# Patient Record
Sex: FEMALE | Race: WHITE | NOT HISPANIC OR LATINO | Employment: UNEMPLOYED | URBAN - METROPOLITAN AREA
[De-identification: names, ages, dates, MRNs, and addresses within clinical notes are randomized per-mention and may not be internally consistent; named-entity substitution may affect disease eponyms.]

---

## 2017-01-19 ENCOUNTER — TELEPHONE (OUTPATIENT)
Dept: RHEUMATOLOGY | Facility: CLINIC | Age: 32
End: 2017-01-19

## 2017-01-19 ENCOUNTER — LAB VISIT (OUTPATIENT)
Dept: LAB | Facility: HOSPITAL | Age: 32
End: 2017-01-19
Attending: INTERNAL MEDICINE
Payer: COMMERCIAL

## 2017-01-19 DIAGNOSIS — I77.6 AORTITIS: ICD-10-CM

## 2017-01-19 LAB
CRP SERPL-MCNC: 24.9 MG/L
ERYTHROCYTE [SEDIMENTATION RATE] IN BLOOD BY WESTERGREN METHOD: 11 MM/HR

## 2017-01-19 PROCEDURE — 85651 RBC SED RATE NONAUTOMATED: CPT | Mod: PO

## 2017-01-19 PROCEDURE — 86140 C-REACTIVE PROTEIN: CPT

## 2017-01-19 PROCEDURE — 36415 COLL VENOUS BLD VENIPUNCTURE: CPT | Mod: PO

## 2017-01-19 NOTE — TELEPHONE ENCOUNTER
----- Message from Patience Cooper sent at 1/19/2017 11:04 AM CST -----  Contact: Patient  Patient called to speak with the nurse; she isn't sure if she should come in to see Dr. Washburn or see someone else. She is having some hissing noises that go along with her heartbeat with chills, dizziness, also feeling faint. She has severe nausea and headache. She stated that he did tell her she has an autoimmune disease. Her pain management doctor told her to check with the doctor to see if it can be associated with the autoimmune. She can be contacted at [phone].    Thanks,  Patience

## 2017-01-19 NOTE — TELEPHONE ENCOUNTER
Spoke with pt and she is having hissing noises that go along with her heartbeat with chills, dizziness, feeling faint, severe nausea and headaches. Pt states her pain management doctor told her to check with you to see if this is related to her autoimmune disease. Pt states that she has joint pains and wanted to know if these symptoms all combined have anything to do with her Aortitis. States she does not know if she needs to see her pain management or us. Please Advise.

## 2017-01-19 NOTE — TELEPHONE ENCOUNTER
Check ESR/CRP. If autoimmune labs return normal without any high inflammation like before, would recommend discussing the problem with her PCP who might refer her to a cardiologist.

## 2017-01-20 ENCOUNTER — TELEPHONE (OUTPATIENT)
Dept: FAMILY MEDICINE | Facility: CLINIC | Age: 32
End: 2017-01-20

## 2017-01-20 NOTE — TELEPHONE ENCOUNTER
Patient has ringing in her ears and her mom thought she may have meningitis as she also has a fever.  Advised after hours clinic or ER as no availability in clinic today, or she can call in am for OV, patient verbalized understanding.

## 2017-01-20 NOTE — TELEPHONE ENCOUNTER
----- Message from Charlotte José sent at 1/20/2017  9:31 AM CST -----  This patient states that she possibly has meningitis and wants to know if you could work her in today.  Call her at [phone].                                        zuleta

## 2017-01-21 ENCOUNTER — OFFICE VISIT (OUTPATIENT)
Dept: FAMILY MEDICINE | Facility: CLINIC | Age: 32
End: 2017-01-21
Payer: COMMERCIAL

## 2017-01-21 VITALS
SYSTOLIC BLOOD PRESSURE: 131 MMHG | DIASTOLIC BLOOD PRESSURE: 83 MMHG | BODY MASS INDEX: 24.54 KG/M2 | HEIGHT: 64 IN | HEART RATE: 110 BPM | TEMPERATURE: 99 F | WEIGHT: 143.75 LBS

## 2017-01-21 DIAGNOSIS — R51.9 NONINTRACTABLE HEADACHE, UNSPECIFIED CHRONICITY PATTERN, UNSPECIFIED HEADACHE TYPE: Primary | ICD-10-CM

## 2017-01-21 DIAGNOSIS — R50.9 FEVER, UNSPECIFIED FEVER CAUSE: ICD-10-CM

## 2017-01-21 DIAGNOSIS — R11.0 NAUSEA: ICD-10-CM

## 2017-01-21 DIAGNOSIS — R42 DIZZINESS: ICD-10-CM

## 2017-01-21 PROCEDURE — 1159F MED LIST DOCD IN RCRD: CPT | Mod: S$GLB,,, | Performed by: NURSE PRACTITIONER

## 2017-01-21 PROCEDURE — 99999 PR PBB SHADOW E&M-EST. PATIENT-LVL III: CPT | Mod: PBBFAC,,, | Performed by: NURSE PRACTITIONER

## 2017-01-21 PROCEDURE — 99213 OFFICE O/P EST LOW 20 MIN: CPT | Mod: S$GLB,,, | Performed by: NURSE PRACTITIONER

## 2017-01-21 RX ORDER — MECLIZINE HYDROCHLORIDE 25 MG/1
25 TABLET ORAL 2 TIMES DAILY PRN
Qty: 20 TABLET | Refills: 0 | Status: SHIPPED | OUTPATIENT
Start: 2017-01-21 | End: 2018-12-26

## 2017-01-21 RX ORDER — DOXYCYCLINE 100 MG/1
100 CAPSULE ORAL 2 TIMES DAILY
Qty: 20 CAPSULE | Refills: 0 | Status: SHIPPED | OUTPATIENT
Start: 2017-01-21 | End: 2017-01-31

## 2017-01-21 NOTE — PROGRESS NOTES
Subjective:       Patient ID: Ludy Carney is a 31 y.o. female.    Chief Complaint: Headache; Dizziness; and Nausea    Headache    This is a new problem. The current episode started 1 to 4 weeks ago. The problem occurs constantly. The problem has been unchanged. The pain is located in the bilateral region. The pain does not radiate. The pain quality is not similar to prior headaches. The quality of the pain is described as aching. The pain is moderate. Associated symptoms include dizziness, a fever, nausea and tinnitus. Pertinent negatives include no abdominal pain, abnormal behavior, anorexia, back pain, blurred vision, coughing, drainage, ear pain, eye pain, eye redness, eye watering, facial sweating, hearing loss, insomnia, loss of balance, muscle aches, neck pain, numbness, phonophobia, photophobia, rhinorrhea, scalp tenderness, seizures, sinus pressure, sore throat, swollen glands, tingling, visual change, weakness or weight loss. Associated symptoms comments: tachycardia. Nothing aggravates the symptoms. She has tried NSAIDs for the symptoms. The treatment provided no relief. There is no history of cancer, cluster headaches, hypertension, immunosuppression, migraine headaches, migraines in the family, obesity, pseudotumor cerebri, recent head traumas, sinus disease or TMJ. (Aortitis, autoimmune disease (per pt))     Past Medical History   Diagnosis Date    Arteritis      2015    DDD (degenerative disc disease), cervical     DDD (degenerative disc disease), lumbar     Endometriosis     Heart palpitations     Pain, pelvic, female      Social History     Social History    Marital status:      Spouse name: N/A    Number of children: N/A    Years of education: N/A     Occupational History         Social History Main Topics    Smoking status: Never Smoker    Smokeless tobacco: Never Used    Alcohol use No    Drug use: No    Sexual activity: Not on file     Past Surgical History    Procedure Laterality Date     section, classic       x 3    Tonsillectomy      Lumbar fusion  12/31/15    Tonsillectomy      Appendectomy      Rhizotomy w/ radiofrequency ablation       several ( back and neck ) over past 8 yrs    Lumbar epidural injection       multiple times over last eight yrs    Dilation and curettage of uterus       x 2    Pelvic laparoscopy       for endometriosis    Hysterectomy  10/15/2016       Review of Systems   Constitutional: Positive for fever. Negative for weight loss.   HENT: Positive for tinnitus. Negative for ear pain, hearing loss, rhinorrhea, sinus pressure and sore throat.    Eyes: Negative.  Negative for blurred vision, photophobia, pain and redness.   Respiratory: Negative.  Negative for cough.    Cardiovascular: Negative.    Gastrointestinal: Positive for nausea. Negative for abdominal pain and anorexia.   Endocrine: Negative.    Genitourinary: Negative.    Musculoskeletal: Negative.  Negative for back pain and neck pain.   Skin: Negative.    Allergic/Immunologic: Negative.    Neurological: Positive for dizziness and headaches. Negative for tingling, seizures, weakness, numbness and loss of balance.   Psychiatric/Behavioral: Negative.  The patient does not have insomnia.        Objective:      Physical Exam   Constitutional: She is oriented to person, place, and time. She appears well-developed and well-nourished.   HENT:   Head: Normocephalic.   Right Ear: External ear normal.   Left Ear: External ear normal.   Nose: Nose normal.   Mouth/Throat: Oropharynx is clear and moist.   Eyes: Conjunctivae are normal. Pupils are equal, round, and reactive to light.   Neck: Normal range of motion. Neck supple.   Cardiovascular: Regular rhythm and normal heart sounds.    Pulmonary/Chest: Effort normal.   Abdominal: Soft. Bowel sounds are normal.   Neurological: She is alert and oriented to person, place, and time.   Skin: Skin is warm and dry.   Psychiatric: She has  a normal mood and affect. Her behavior is normal. Judgment and thought content normal.   Nursing note and vitals reviewed.      Assessment:       1. Nonintractable headache, unspecified chronicity pattern, unspecified headache type    2. Nausea    3. Fever, unspecified fever cause    4. Dizziness        Plan:           Ludy was seen today for headache, dizziness and nausea.    Diagnoses and all orders for this visit:    Nonintractable headache, unspecified chronicity pattern, unspecified headache type  Nausea  Fever, unspecified fever cause  Dizziness  -     doxycycline (MONODOX) 100 MG capsule; Take 1 capsule (100 mg total) by mouth 2 (two) times daily.  -     meclizine (ANTIVERT) 25 mg tablet; Take 1 tablet (25 mg total) by mouth 2 (two) times daily as needed for Dizziness or Nausea.  Pt advised to report to ER

## 2017-01-21 NOTE — MR AVS SNAPSHOT
[facility]  [address] [address]  Phone: [phone]  Fax: [phone]                  Ludy Carney   2017 9:40 AM   Office Visit    Description:  Female : 1985   Provider:  Kesha New NP   Department:  [facility]           Reason for Visit     Headache     Dizziness     Nausea           Diagnoses this Visit        Comments    Nonintractable headache, unspecified chronicity pattern, unspecified headache type    -  Primary     Nausea         Fever, unspecified fever cause         Dizziness                To Do List           Future Appointments        Provider Department Dept Phone    2017 1:45 PM Maged Lemos MD [facility] [phone]    2017 2:25 PM LABORATORY, [place] [facility] [phone]      Goals (5 Years of Data)     None       These Medications        Disp Refills Start End    doxycycline (MONODOX) 100 MG capsule 20 capsule 0 2017    Take 1 capsule (100 mg total) by mouth 2 (two) times daily. - Oral    Pharmacy: [facility]  [place] [address] Ph #: [phone]       meclizine (ANTIVERT) 25 mg tablet 20 tablet 0 2017    Take 1 tablet (25 mg total) by mouth 2 (two) times daily as needed for Dizziness or Nausea. - Oral    Pharmacy: [facility]  [place] [address] Ph #: [phone]         [facility] On Call     [facility] On [facility] Nurse Care Line -  Assistance  Registered nurses in the [facility] Center provide clinical advisement, health education, appointment booking, and other advisory services.  Call for this free service at [phone].             Medications           Message regarding Medications     Verify the changes and/or additions to your medication regime listed below are the same as discussed with your clinician today.  If any of these  "changes or additions are incorrect, please notify your healthcare provider.        START taking these NEW medications        Refills    doxycycline (MONODOX) 100 MG capsule 0    Sig: Take 1 capsule (100 mg total) by mouth 2 (two) times daily.    Class: Normal    Route: Oral    meclizine (ANTIVERT) 25 mg tablet 0    Sig: Take 1 tablet (25 mg total) by mouth 2 (two) times daily as needed for Dizziness or Nausea.    Class: Normal    Route: Oral           Verify that the below list of medications is an accurate representation of the medications you are currently taking.  If none reported, the list may be blank. If incorrect, please contact your healthcare provider. Carry this list with you in case of emergency.           Current Medications     escitalopram oxalate (LEXAPRO) 10 MG tablet Take 20 mg by mouth every evening.     fentanyl (DURAGESIC) 12 mcg/hr PT72 APPLY ONE PATCH EVERY 72 HOURS; q 48 hrs    gabapentin (NEURONTIN) 400 MG capsule Take 400 mg by mouth 3 (three) times daily.     NORGESTIMATE-ETHINYL ESTRADIOL (ORTHO TRI-CYCLEN LO, 28, ORAL) Take 1 tablet by mouth every evening.    oxycodone-acetaminophen (PERCOCET)  mg per tablet Take 1 tablet by mouth 3 (three) times daily as needed.     tizanidine (ZANAFLEX) 4 MG tablet Take 4 mg by mouth 2 (two) times daily as needed.     doxycycline (MONODOX) 100 MG capsule Take 1 capsule (100 mg total) by mouth 2 (two) times daily.    meclizine (ANTIVERT) 25 mg tablet Take 1 tablet (25 mg total) by mouth 2 (two) times daily as needed for Dizziness or Nausea.           Clinical Reference Information           Vital Signs - Last Recorded  Most recent update: 1/21/2017  9:51 AM by Patricia Brennan LPN    BP Pulse Temp Ht Wt LMP    131/83 110 99.3 °F (37.4 °C) 5' 4" (1.626 m) 65.2 kg (143 lb 11.8 oz) 10/05/2016    BMI                24.67 kg/m2          Blood Pressure          Most Recent Value    BP  131/83      Allergies as of 1/21/2017     Venom-wasp    Ortho " Evra [Norelgestromin-ethin.estradiol]      Immunizations Administered on Date of Encounter - 1/21/2017     None      Instructions    Recommend report to ER immediately  F/U with PCP

## 2017-01-23 ENCOUNTER — PATIENT MESSAGE (OUTPATIENT)
Dept: FAMILY MEDICINE | Facility: CLINIC | Age: 32
End: 2017-01-23

## 2017-01-27 ENCOUNTER — OFFICE VISIT (OUTPATIENT)
Dept: FAMILY MEDICINE | Facility: CLINIC | Age: 32
End: 2017-01-27
Payer: COMMERCIAL

## 2017-01-27 VITALS
HEART RATE: 81 BPM | WEIGHT: 144.94 LBS | DIASTOLIC BLOOD PRESSURE: 84 MMHG | BODY MASS INDEX: 24.74 KG/M2 | TEMPERATURE: 99 F | SYSTOLIC BLOOD PRESSURE: 124 MMHG | HEIGHT: 64 IN

## 2017-01-27 DIAGNOSIS — M79.7 FIBROMYALGIA: ICD-10-CM

## 2017-01-27 DIAGNOSIS — J06.9 UPPER RESPIRATORY TRACT INFECTION, UNSPECIFIED TYPE: Primary | ICD-10-CM

## 2017-01-27 DIAGNOSIS — H93.13 TINNITUS, BILATERAL: ICD-10-CM

## 2017-01-27 DIAGNOSIS — G89.4 CHRONIC PAIN SYNDROME: ICD-10-CM

## 2017-01-27 PROCEDURE — 1159F MED LIST DOCD IN RCRD: CPT | Mod: S$GLB,,, | Performed by: FAMILY MEDICINE

## 2017-01-27 PROCEDURE — 99999 PR PBB SHADOW E&M-EST. PATIENT-LVL II: CPT | Mod: PBBFAC,,, | Performed by: FAMILY MEDICINE

## 2017-01-27 PROCEDURE — 96372 THER/PROPH/DIAG INJ SC/IM: CPT | Mod: S$GLB,,, | Performed by: FAMILY MEDICINE

## 2017-01-27 PROCEDURE — 99214 OFFICE O/P EST MOD 30 MIN: CPT | Mod: 25,S$GLB,, | Performed by: FAMILY MEDICINE

## 2017-01-27 RX ORDER — CEFACLOR 500 MG
500 CAPSULE ORAL 2 TIMES DAILY
Qty: 14 CAPSULE | Refills: 0 | Status: SHIPPED | OUTPATIENT
Start: 2017-01-27 | End: 2017-02-06

## 2017-01-27 RX ORDER — DEXAMETHASONE SODIUM PHOSPHATE 4 MG/ML
8 INJECTION, SOLUTION INTRA-ARTICULAR; INTRALESIONAL; INTRAMUSCULAR; INTRAVENOUS; SOFT TISSUE
Status: COMPLETED | OUTPATIENT
Start: 2017-01-27 | End: 2017-01-27

## 2017-01-27 RX ADMIN — DEXAMETHASONE SODIUM PHOSPHATE 8 MG: 4 INJECTION, SOLUTION INTRA-ARTICULAR; INTRALESIONAL; INTRAMUSCULAR; INTRAVENOUS; SOFT TISSUE at 10:01

## 2017-01-27 NOTE — PROGRESS NOTES
Ludy Carney presents with moderate upper respiratory congestion, fever chills   Denies nausea,vomiting,diarrhea or significant fever.  Also fibromyalgia chronic pain and nikolas of narcotics Also, she. Pulsatile tinnitus     Past Medical History   Diagnosis Date    Arteritis          DDD (degenerative disc disease), cervical     DDD (degenerative disc disease), lumbar     Endometriosis     Heart palpitations     Pain, pelvic, female      Past Surgical History   Procedure Laterality Date     section, classic       x 3    Tonsillectomy      Lumbar fusion  12/31/15    Tonsillectomy      Appendectomy      Rhizotomy w/ radiofrequency ablation       several ( back and neck ) over past 8 yrs    Lumbar epidural injection       multiple times over last eight yrs    Dilation and curettage of uterus       x 2    Pelvic laparoscopy       for endometriosis    Hysterectomy  10/15/2016     Review of patient's allergies indicates:   Allergen Reactions    Venom-wasp     Ortho evra [norelgestromin-ethin.estradiol] Other (See Comments)     Chest pains     Current Outpatient Prescriptions on File Prior to Visit   Medication Sig Dispense Refill    doxycycline (MONODOX) 100 MG capsule Take 1 capsule (100 mg total) by mouth 2 (two) times daily. 20 capsule 0    escitalopram oxalate (LEXAPRO) 10 MG tablet Take 20 mg by mouth every evening.   0    fentanyl (DURAGESIC) 12 mcg/hr PT72 APPLY ONE PATCH EVERY 72 HOURS; q 48 hrs  0    gabapentin (NEURONTIN) 400 MG capsule Take 400 mg by mouth 3 (three) times daily.       meclizine (ANTIVERT) 25 mg tablet Take 1 tablet (25 mg total) by mouth 2 (two) times daily as needed for Dizziness or Nausea. 20 tablet 0    NORGESTIMATE-ETHINYL ESTRADIOL (ORTHO TRI-CYCLEN LO, 28, ORAL) Take 1 tablet by mouth every evening.      oxycodone-acetaminophen (PERCOCET)  mg per tablet Take 1 tablet by mouth 3 (three) times daily as needed.   0    tizanidine  (ZANAFLEX) 4 MG tablet Take 4 mg by mouth 2 (two) times daily as needed.   0     No current facility-administered medications on file prior to visit.      Social History     Social History    Marital status:      Spouse name: N/A    Number of children: N/A    Years of education: N/A     Occupational History    Not on file.     Social History Main Topics    Smoking status: Never Smoker    Smokeless tobacco: Never Used    Alcohol use No    Drug use: No    Sexual activity: Not on file     Other Topics Concern    Not on file     Social History Narrative     Family History   Problem Relation Age of Onset    Hypertension Mother     Heart disease Mother     Hyperlipidemia Mother     Arthritis Mother     Depression Father     Early death Father 32    No Known Problems Sister     No Known Problems Brother     No Known Problems Brother          ROS:  SKIN: No rashes, itching or changes in color or texture of skin.  EYES: Visual acuity fine. No photophobia, ocular pain or diplopia.EARS: Denies ear pain, discharge or vertigo.NOSE: No loss of smell, no epistaxis some postnasal drip.MOUTH & THROAT: No hoarseness or change in voice. No excessive gum bleeding.CHEST: Denies JACOBS, cyanosis, wheezing  CARDIOVASCULAR: Denies chest pain, PND, orthopnea or reduced exercise tolerance.  ABDOMEN:  No weight loss.No abdominal pain, no hematemesis or blood in stool.  URINARY: No flank pain, dysuria or hematuria.  PERIPHERAL VASCULAR: No claudication or cyanosis.  MUSCULOSKELETAL: Negative   NEUROLOGIC: No history of seizures, paralysis, alteration of gait or coordination.    PE: Vital signs as noted  Heent:Normocephalic with no recent cranial trauma,PERRLA,EOMI,conjunctiva clear,fundi reveal no hemmorhage exudate or papilledema.Otic canals clear, tympanic membranes slightly dull bilaterally.Nasal mucosa slightly red and edematous.Posterior pharynx slightly red but without exudate.  Neck:Supple with minimal anterior  cervical adenopathy.  Chest:Clear bilateral breath sounds with mild scattered ronchi  Heart:Regular rhthym without murmer  Abdomen:Soft, non tender,no masses, no hepatosplenomegalyExtremeties and Neurologic:Grossly within normal limits  Impression: Upper Respiratory Infection. 465.9  Diagnoses and all orders for this visit:    Upper respiratory tract infection, unspecified type    Tinnitus, bilateral    Fibromyalgia    Chronic pain syndrome      Plan:   Rev med risk sergo trial incr sriram  In future will try to taper dc narcotics  After resolution URI consider diltiazem low dose for tinnitus

## 2017-03-22 ENCOUNTER — OFFICE VISIT (OUTPATIENT)
Dept: FAMILY MEDICINE | Facility: CLINIC | Age: 32
End: 2017-03-22
Payer: COMMERCIAL

## 2017-03-22 VITALS
TEMPERATURE: 99 F | HEART RATE: 96 BPM | BODY MASS INDEX: 24.46 KG/M2 | WEIGHT: 143.31 LBS | HEIGHT: 64 IN | DIASTOLIC BLOOD PRESSURE: 67 MMHG | SYSTOLIC BLOOD PRESSURE: 112 MMHG

## 2017-03-22 DIAGNOSIS — B09 VIRAL EXANTHEM: ICD-10-CM

## 2017-03-22 DIAGNOSIS — R21 RASH: Primary | ICD-10-CM

## 2017-03-22 PROCEDURE — 99999 PR PBB SHADOW E&M-EST. PATIENT-LVL III: CPT | Mod: PBBFAC,,, | Performed by: NURSE PRACTITIONER

## 2017-03-22 PROCEDURE — 99213 OFFICE O/P EST LOW 20 MIN: CPT | Mod: S$GLB,,, | Performed by: NURSE PRACTITIONER

## 2017-03-22 PROCEDURE — 1160F RVW MEDS BY RX/DR IN RCRD: CPT | Mod: S$GLB,,, | Performed by: NURSE PRACTITIONER

## 2017-03-22 RX ORDER — METHYLPREDNISOLONE 4 MG/1
TABLET ORAL
Qty: 1 PACKAGE | Refills: 0 | Status: SHIPPED | OUTPATIENT
Start: 2017-03-22 | End: 2017-04-12

## 2017-03-22 RX ORDER — HYDROXYZINE HYDROCHLORIDE 25 MG/1
25 TABLET, FILM COATED ORAL 2 TIMES DAILY PRN
Qty: 14 TABLET | Refills: 0 | Status: SHIPPED | OUTPATIENT
Start: 2017-03-22 | End: 2017-03-29

## 2017-03-22 RX ORDER — TRIAMCINOLONE ACETONIDE 5 MG/G
CREAM TOPICAL 2 TIMES DAILY
Qty: 30 G | Refills: 0 | Status: SHIPPED | OUTPATIENT
Start: 2017-03-22 | End: 2018-12-26

## 2017-03-22 NOTE — MR AVS SNAPSHOT
[facility]  [address]  [place] [address]  Phone: [phone]  Fax: [phone]                  Ludy Carney   3/22/2017 9:40 AM   Office Visit    Description:  Female : 1985   Provider:  Kesha New NP   Department:  [facility]           Reason for Visit     Rash           Diagnoses this Visit        Comments    Rash    -  Primary     Viral exanthem                To Do List           Future Appointments        Provider Department Dept Phone    2017 2:25 PM LABORATORY, [place] [facility] [phone]      Goals (5 Years of Data)     None       These Medications        Disp Refills Start End    methylPREDNISolone (MEDROL DOSEPACK) 4 mg tablet 1 Package 0 3/22/2017 2017    use as directed    [place] [facility] [place] [place] [address] Ph #: [phone]       triamcinolone acetonide 0.5% (KENALOG) 0.5 % Crea 30 g 0 3/22/2017 3/29/2017    Apply topically 2 (two) times daily. - Topical (Top)    [place] [facility] [place] [address] Ph #: [phone]       hydrOXYzine HCl (ATARAX) 25 MG tablet 14 tablet 0 3/22/2017 3/29/2017    Take 1 tablet (25 mg total) by mouth 2 (two) times daily as needed. - Oral    [place] [facility] [place] [address] Ph #: [phone]         [facility] On Call     [facility] On Call Nurse Care Line -  Assistance  Registered nurses in the [facility] Center provide clinical advisement, health education, appointment booking, and other advisory services.  Call for this free service at [phone].             Medications           Message regarding Medications     Verify the changes and/or additions to your medication regime listed below are the same as discussed with your clinician today.  If any of these changes or additions are incorrect, please notify your  "healthcare provider.        START taking these NEW medications        Refills    methylPREDNISolone (MEDROL DOSEPACK) 4 mg tablet 0    Sig: use as directed    Class: Normal    triamcinolone acetonide 0.5% (KENALOG) 0.5 % Crea 0    Sig: Apply topically 2 (two) times daily.    Class: Normal    Route: Topical (Top)    hydrOXYzine HCl (ATARAX) 25 MG tablet 0    Sig: Take 1 tablet (25 mg total) by mouth 2 (two) times daily as needed.    Class: Normal    Route: Oral           Verify that the below list of medications is an accurate representation of the medications you are currently taking.  If none reported, the list may be blank. If incorrect, please contact your healthcare provider. Carry this list with you in case of emergency.           Current Medications     escitalopram oxalate (LEXAPRO) 10 MG tablet Take 20 mg by mouth every evening.     fentanyl (DURAGESIC) 12 mcg/hr PT72 APPLY ONE PATCH EVERY 72 HOURS; q 48 hrs    gabapentin (NEURONTIN) 400 MG capsule Take 400 mg by mouth 3 (three) times daily.     NORGESTIMATE-ETHINYL ESTRADIOL (ORTHO TRI-CYCLEN LO, 28, ORAL) Take 1 tablet by mouth every evening.    oxycodone-acetaminophen (PERCOCET)  mg per tablet Take 1 tablet by mouth 3 (three) times daily as needed.     tizanidine (ZANAFLEX) 4 MG tablet Take 4 mg by mouth 2 (two) times daily as needed.     hydrOXYzine HCl (ATARAX) 25 MG tablet Take 1 tablet (25 mg total) by mouth 2 (two) times daily as needed.    meclizine (ANTIVERT) 25 mg tablet Take 1 tablet (25 mg total) by mouth 2 (two) times daily as needed for Dizziness or Nausea.    methylPREDNISolone (MEDROL DOSEPACK) 4 mg tablet use as directed    triamcinolone acetonide 0.5% (KENALOG) 0.5 % Crea Apply topically 2 (two) times daily.           Clinical Reference Information           Your Vitals Were     BP Pulse Temp Height Weight Last Period    112/67 96 99.1 °F (37.3 °C) 5' 4" (1.626 m) 65 kg (143 lb 4.8 oz) 10/05/2016    BMI                24.6 kg/m2     "      Blood Pressure          Most Recent Value    BP  112/67      Allergies as of 3/22/2017     Venom-wasp    Ortho Evra [Norelgestromin-ethin.estradiol]      Immunizations Administered on Date of Encounter - 3/22/2017     None      Instructions    RTC as needed       Language Assistance Services     ATTENTION: Language assistance services are available, free of charge. Please call [phone].      ATENCIÓN: Si habla español, tiene a lala disposición servicios gratuitos de asistencia lingüística. Llame al [phone].     CHÚ Ý: N?u b?n nói Ti?ng Vi?t, có các d?ch v? h? tr? ngôn ng? mi?n phí dành cho b?n. G?i s? [phone].         [facility] complies with applicable Federal civil rights laws and does not discriminate on the basis of race, color, national origin, age, disability, or sex.

## 2017-03-22 NOTE — PROGRESS NOTES
Subjective:       Patient ID: Ludy Carney is a 32 y.o. female.    Chief Complaint: Rash (itches, tender)    Rash   This is a new problem. The current episode started in the past 7 days. The problem is unchanged. The affected locations include the neck. The rash is characterized by redness and itchiness. She was exposed to nothing (States had possible viral illness last week). Pertinent negatives include no anorexia, congestion, cough, diarrhea, eye pain, facial edema, fatigue, fever, joint pain, nail changes, rhinorrhea, shortness of breath, sore throat or vomiting. Past treatments include nothing. The treatment provided no relief. There is no history of allergies, asthma, eczema or varicella.     Past Medical History:   Diagnosis Date    Arteritis         DDD (degenerative disc disease), cervical     DDD (degenerative disc disease), lumbar     Endometriosis     Heart palpitations     Pain, pelvic, female      Social History     Social History    Marital status:      Spouse name: N/A    Number of children: N/A    Years of education: N/A     Occupational History         Social History Main Topics    Smoking status: Never Smoker    Smokeless tobacco: Never Used    Alcohol use No    Drug use: No    Sexual activity: Not on file     Social History Narrative     Past Surgical History:   Procedure Laterality Date    APPENDECTOMY       SECTION, CLASSIC      x 3    DILATION AND CURETTAGE OF UTERUS      x 2    HYSTERECTOMY  10/15/2016    LUMBAR EPIDURAL INJECTION      multiple times over last eight yrs    LUMBAR FUSION  12/31/15    PELVIC LAPAROSCOPY      for endometriosis    RHIZOTOMY W/ RADIOFREQUENCY ABLATION      several ( back and neck ) over past 8 yrs    tonsillectomy      TONSILLECTOMY         Review of Systems   Constitutional: Negative.  Negative for fatigue and fever.   HENT: Negative.  Negative for congestion, rhinorrhea and sore throat.    Eyes: Negative.   Negative for pain.   Respiratory: Negative.  Negative for cough and shortness of breath.    Cardiovascular: Negative.    Gastrointestinal: Negative.  Negative for anorexia, diarrhea and vomiting.   Endocrine: Negative.    Genitourinary: Negative.    Musculoskeletal: Negative.  Negative for joint pain.   Skin: Positive for rash. Negative for nail changes.   Allergic/Immunologic: Negative.    Neurological: Negative.    Psychiatric/Behavioral: Negative.        Objective:      Physical Exam   Constitutional: She is oriented to person, place, and time. She appears well-developed and well-nourished.   HENT:   Head: Normocephalic.   Right Ear: External ear normal.   Left Ear: External ear normal.   Nose: Nose normal.   Mouth/Throat: Oropharynx is clear and moist.   Eyes: Conjunctivae are normal. Pupils are equal, round, and reactive to light.   Neck: Normal range of motion. Neck supple.   Cardiovascular: Normal rate, regular rhythm and normal heart sounds.    Pulmonary/Chest: Effort normal and breath sounds normal.   Abdominal: Soft. Bowel sounds are normal.   Musculoskeletal: Normal range of motion.   Neurological: She is alert and oriented to person, place, and time.   Skin: Skin is warm and dry. Rash (posterior neck) noted. No purpura noted. Rash is urticarial. Rash is not macular, not papular, not maculopapular, not nodular, not pustular and not vesicular.   Psychiatric: She has a normal mood and affect. Her behavior is normal. Judgment and thought content normal.   Nursing note and vitals reviewed.      Assessment:       1. Rash    2. Viral exanthem        Plan:           Ludy was seen today for rash.    Diagnoses and all orders for this visit:    Rash  Viral exanthem  -     methylPREDNISolone (MEDROL DOSEPACK) 4 mg tablet; use as directed  -     triamcinolone acetonide 0.5% (KENALOG) 0.5 % Crea; Apply topically 2 (two) times daily.  -     hydrOXYzine HCl (ATARAX) 25 MG tablet; Take 1 tablet (25 mg total) by mouth  2 (two) times daily as needed.

## 2017-10-07 ENCOUNTER — HOSPITAL ENCOUNTER (EMERGENCY)
Facility: HOSPITAL | Age: 32
Discharge: HOME | End: 2017-10-08
Attending: EMERGENCY MEDICINE
Payer: COMMERCIAL

## 2017-10-07 DIAGNOSIS — K59.00 CONSTIPATION, UNSPECIFIED CONSTIPATION TYPE: ICD-10-CM

## 2017-10-07 DIAGNOSIS — R10.9 ABDOMINAL PAIN, UNSPECIFIED ABDOMINAL LOCATION: Primary | ICD-10-CM

## 2017-10-07 LAB
B-HCG UR QL: NEGATIVE
BASOPHILS # BLD AUTO: 0.03 K/UL
BASOPHILS NFR BLD: 0.3 %
BILIRUB UR QL STRIP: NEGATIVE
BUN SERPL-MCNC: 17 MG/DL (ref 6–30)
CHLORIDE SERPL-SCNC: 102 MMOL/L (ref 95–110)
CLARITY UR REFRACT.AUTO: ABNORMAL
COLOR UR AUTO: YELLOW
CREAT SERPL-MCNC: 0.5 MG/DL (ref 0.5–1.4)
CTP QC/QA: YES
DIFFERENTIAL METHOD: NORMAL
EOSINOPHIL # BLD AUTO: 0.1 K/UL
EOSINOPHIL NFR BLD: 1.3 %
ERYTHROCYTE [DISTWIDTH] IN BLOOD BY AUTOMATED COUNT: 12.2 %
GLUCOSE SERPL-MCNC: 116 MG/DL (ref 70–110)
GLUCOSE UR QL STRIP: NEGATIVE
HCT VFR BLD AUTO: 41 %
HCT VFR BLD CALC: 42 %PCV (ref 36–54)
HGB BLD-MCNC: 14.5 G/DL
HGB UR QL STRIP: NEGATIVE
KETONES UR QL STRIP: NEGATIVE
LEUKOCYTE ESTERASE UR QL STRIP: NEGATIVE
LYMPHOCYTES # BLD AUTO: 4.2 K/UL
LYMPHOCYTES NFR BLD: 43.4 %
MCH RBC QN AUTO: 29 PG
MCHC RBC AUTO-ENTMCNC: 35.4 G/DL
MCV RBC AUTO: 82 FL
MONOCYTES # BLD AUTO: 0.6 K/UL
MONOCYTES NFR BLD: 5.9 %
NEUTROPHILS # BLD AUTO: 4.8 K/UL
NEUTROPHILS NFR BLD: 48.9 %
NITRITE UR QL STRIP: NEGATIVE
PH UR STRIP: 7 [PH] (ref 5–8)
PLATELET # BLD AUTO: 231 K/UL
PMV BLD AUTO: 9.7 FL
POC IONIZED CALCIUM: 1.1 MMOL/L (ref 1.06–1.42)
POC TCO2 (MEASURED): 25 MMOL/L (ref 23–29)
POTASSIUM BLD-SCNC: 3.8 MMOL/L (ref 3.5–5.1)
PROT UR QL STRIP: NEGATIVE
RBC # BLD AUTO: 5 M/UL
SAMPLE: ABNORMAL
SODIUM BLD-SCNC: 139 MMOL/L (ref 136–145)
SP GR UR STRIP: 1.01 (ref 1–1.03)
URN SPEC COLLECT METH UR: ABNORMAL
UROBILINOGEN UR STRIP-ACNC: NEGATIVE EU/DL
WBC # BLD AUTO: 9.74 K/UL

## 2017-10-07 PROCEDURE — 85025 COMPLETE CBC W/AUTO DIFF WBC: CPT

## 2017-10-07 PROCEDURE — 81003 URINALYSIS AUTO W/O SCOPE: CPT

## 2017-10-07 PROCEDURE — 80053 COMPREHEN METABOLIC PANEL: CPT

## 2017-10-07 PROCEDURE — 81025 URINE PREGNANCY TEST: CPT | Performed by: EMERGENCY MEDICINE

## 2017-10-07 PROCEDURE — 83690 ASSAY OF LIPASE: CPT

## 2017-10-07 PROCEDURE — 99284 EMERGENCY DEPT VISIT MOD MDM: CPT | Mod: 25

## 2017-10-07 PROCEDURE — 96375 TX/PRO/DX INJ NEW DRUG ADDON: CPT

## 2017-10-07 PROCEDURE — 96374 THER/PROPH/DIAG INJ IV PUSH: CPT

## 2017-10-07 PROCEDURE — 99284 EMERGENCY DEPT VISIT MOD MDM: CPT | Mod: ,,, | Performed by: EMERGENCY MEDICINE

## 2017-10-07 RX ORDER — CELECOXIB 400 MG/1
400 CAPSULE ORAL 2 TIMES DAILY
COMMUNITY
End: 2018-09-28

## 2017-10-08 VITALS
SYSTOLIC BLOOD PRESSURE: 132 MMHG | WEIGHT: 145 LBS | TEMPERATURE: 99 F | BODY MASS INDEX: 24.75 KG/M2 | HEART RATE: 89 BPM | HEIGHT: 64 IN | DIASTOLIC BLOOD PRESSURE: 79 MMHG | RESPIRATION RATE: 18 BRPM | OXYGEN SATURATION: 99 %

## 2017-10-08 LAB
ALBUMIN SERPL BCP-MCNC: 3.8 G/DL
ALP SERPL-CCNC: 77 U/L
ALT SERPL W/O P-5'-P-CCNC: 19 U/L
ANION GAP SERPL CALC-SCNC: 10 MMOL/L
AST SERPL-CCNC: 17 U/L
BILIRUB SERPL-MCNC: 0.2 MG/DL
BUN SERPL-MCNC: 15 MG/DL
CALCIUM SERPL-MCNC: 9.2 MG/DL
CHLORIDE SERPL-SCNC: 105 MMOL/L
CO2 SERPL-SCNC: 24 MMOL/L
CREAT SERPL-MCNC: 0.7 MG/DL
EST. GFR  (AFRICAN AMERICAN): >60 ML/MIN/1.73 M^2
EST. GFR  (NON AFRICAN AMERICAN): >60 ML/MIN/1.73 M^2
GLUCOSE SERPL-MCNC: 119 MG/DL
LIPASE SERPL-CCNC: 27 U/L
POTASSIUM SERPL-SCNC: 3.8 MMOL/L
PROT SERPL-MCNC: 7.4 G/DL
SODIUM SERPL-SCNC: 139 MMOL/L

## 2017-10-08 PROCEDURE — 63600175 PHARM REV CODE 636 W HCPCS: Performed by: STUDENT IN AN ORGANIZED HEALTH CARE EDUCATION/TRAINING PROGRAM

## 2017-10-08 PROCEDURE — 25000003 PHARM REV CODE 250: Performed by: STUDENT IN AN ORGANIZED HEALTH CARE EDUCATION/TRAINING PROGRAM

## 2017-10-08 PROCEDURE — 25500020 PHARM REV CODE 255: Performed by: EMERGENCY MEDICINE

## 2017-10-08 RX ORDER — ONDANSETRON 2 MG/ML
4 INJECTION INTRAMUSCULAR; INTRAVENOUS
Status: COMPLETED | OUTPATIENT
Start: 2017-10-08 | End: 2017-10-08

## 2017-10-08 RX ORDER — IBUPROFEN 600 MG/1
600 TABLET ORAL 3 TIMES DAILY
Qty: 30 TABLET | Refills: 0 | OUTPATIENT
Start: 2017-10-08 | End: 2018-09-28

## 2017-10-08 RX ORDER — POLYETHYLENE GLYCOL 3350 17 G/17G
17 POWDER, FOR SOLUTION ORAL DAILY
Qty: 238 G | Refills: 0 | Status: SHIPPED | OUTPATIENT
Start: 2017-10-08 | End: 2018-12-26

## 2017-10-08 RX ORDER — MORPHINE SULFATE 4 MG/ML
4 INJECTION, SOLUTION INTRAMUSCULAR; INTRAVENOUS
Status: COMPLETED | OUTPATIENT
Start: 2017-10-08 | End: 2017-10-08

## 2017-10-08 RX ORDER — HYDROMORPHONE HYDROCHLORIDE 1 MG/ML
1 INJECTION, SOLUTION INTRAMUSCULAR; INTRAVENOUS; SUBCUTANEOUS
Status: COMPLETED | OUTPATIENT
Start: 2017-10-08 | End: 2017-10-08

## 2017-10-08 RX ORDER — IBUPROFEN 600 MG/1
600 TABLET ORAL
Status: COMPLETED | OUTPATIENT
Start: 2017-10-08 | End: 2017-10-08

## 2017-10-08 RX ADMIN — IBUPROFEN 600 MG: 600 TABLET, FILM COATED ORAL at 03:10

## 2017-10-08 RX ADMIN — IOHEXOL 75 ML: 350 INJECTION, SOLUTION INTRAVENOUS at 03:10

## 2017-10-08 RX ADMIN — HYDROMORPHONE HYDROCHLORIDE 1 MG: 1 INJECTION, SOLUTION INTRAMUSCULAR; INTRAVENOUS; SUBCUTANEOUS at 12:10

## 2017-10-08 RX ADMIN — ONDANSETRON 4 MG: 2 INJECTION INTRAMUSCULAR; INTRAVENOUS at 12:10

## 2017-10-08 RX ADMIN — MORPHINE SULFATE 4 MG: 4 INJECTION INTRAVENOUS at 03:10

## 2017-10-08 NOTE — ED TRIAGE NOTES
Patient presents RLQ with radiation to the left side of her abdomen that began this morning at 0700 with worsening progression accompanied by nausea without emesis and lower quadrant abdominal distention. Patient reports sharp stabbing feeling when she bears down to urinate.    Patient denies change in bowel habits

## 2017-10-08 NOTE — ED PROVIDER NOTES
Encounter Date: 10/7/2017       History     Chief Complaint   Patient presents with    Abdominal Pain     Pt reports abdominal pain since this am. Reports Nausea. Denies change in bowel habits.     31yo female s/p appendectomy for ruptured appendix and hysterectomy without oophorectomy as well as L4-S1 PSF who presents with severe RLQ abdominal pain since this AM. She reports nausea without emesis or diarrhea. She has not been able to tolerate any significant food or water. Patient presented from upstairs, her daughter is currently admitted to the hospital with bacterial meningitis since last Tuesday.    She reports the pain is similar to that she experiences when she has a ruptured ovarian cyst or when her appendix ruptured. She reports that she is not due to ovulate. Does not take any hormonal supplements.     Denies SOB, Chest pain, pain that radiates through to the back  Patient wears a fentanyl patch which is currently in place.          Review of patient's allergies indicates:   Allergen Reactions    Venom-wasp     Ortho evra [norelgestromin-ethin.estradiol] Other (See Comments)     Chest pains     Past Medical History:   Diagnosis Date    Arteritis         DDD (degenerative disc disease), cervical     DDD (degenerative disc disease), lumbar     Endometriosis     Heart palpitations     History of lumbar spinal fusion     Pain, pelvic, female      Past Surgical History:   Procedure Laterality Date    APPENDECTOMY       SECTION, CLASSIC      x 3    DILATION AND CURETTAGE OF UTERUS      x 2    HYSTERECTOMY  10/15/2016    LUMBAR EPIDURAL INJECTION      multiple times over last eight yrs    LUMBAR FUSION  12/31/15    PELVIC LAPAROSCOPY      for endometriosis    RHIZOTOMY W/ RADIOFREQUENCY ABLATION      several ( back and neck ) over past 8 yrs    tonsillectomy      TONSILLECTOMY       Family History   Problem Relation Age of Onset    Hypertension Mother     Heart disease Mother      Hyperlipidemia Mother     Arthritis Mother     Depression Father     Early death Father 32    No Known Problems Sister     No Known Problems Brother     No Known Problems Brother      Social History   Substance Use Topics    Smoking status: Never Smoker    Smokeless tobacco: Never Used    Alcohol use No     Review of Systems   Constitutional: Positive for appetite change, chills and diaphoresis. Negative for fever.   Eyes: Negative for visual disturbance.   Respiratory: Negative for shortness of breath.    Cardiovascular: Negative for chest pain.   Gastrointestinal: Positive for abdominal pain and nausea. Negative for diarrhea and vomiting.   Musculoskeletal: Positive for arthralgias. Negative for back pain.   Skin: Negative for color change.   Neurological: Negative for dizziness, weakness and light-headedness.   Psychiatric/Behavioral: Positive for agitation. Negative for behavioral problems, confusion and decreased concentration.       Physical Exam     Initial Vitals [10/07/17 2317]   BP Pulse Resp Temp SpO2   137/83 87 17 99.1 °F (37.3 °C) 98 %      MAP       101         Physical Exam    Constitutional: She appears well-developed and well-nourished. She appears distressed.   HENT:   Head: Normocephalic and atraumatic.   Eyes: EOM are normal. Pupils are equal, round, and reactive to light.   Neck: Normal range of motion. Neck supple.   Cardiovascular: Normal rate and regular rhythm.   Pulmonary/Chest: Breath sounds normal. No respiratory distress.   Abdominal: She exhibits distension. There is tenderness in the right lower quadrant. There is rebound and guarding.   Skin: Skin is warm and dry.         ED Course   Procedures  Labs Reviewed   URINALYSIS, REFLEX TO URINE CULTURE - Abnormal; Notable for the following:        Result Value    Appearance, UA Hazy (*)     All other components within normal limits    Narrative:     Preferred Collection Type->Urine, Clean Catch   ISTAT PROCEDURE - Abnormal;  Notable for the following:     POC Glucose 116 (*)     All other components within normal limits   CBC W/ AUTO DIFFERENTIAL   COMPREHENSIVE METABOLIC PANEL   LIPASE   POCT URINE PREGNANCY   ISTAT CHEM8             Medical Decision Making:   History:   Old Medical Records: I decided to obtain old medical records.  Old Records Summarized: records from previous admission(s) and records from clinic visits.  Initial Assessment:   31yo female with extensive PMHx presents with severe RLQ abdominal pain since this AM with nausea and decreased PO intake.  Differential Diagnosis:   Differential diagnosis includes but is not limited to ovarian cyst, ovarian pathology, SBO, LBO, diverticulitis, pain 2/2 adhesions, acute exacerbation of LBP with referral  Clinical Tests:   Lab Tests: Ordered and Reviewed  The following lab test(s) were unremarkable: CBC and CMP  Radiological Study: Ordered  ED Management:  12:17 AM - Patient with severe RLQ pain s/p appendectomy and hysterectomy. Will obtain pelvic U/S for initial evaluation and reevaluate pending imaging studies.    2:54 AM - U/S negative, patient remains in significant pain. IV pain meds administered. Will discuss possible CT imaging.    3:38 AM - CT scan negative for acute intraabdominal pathology  Other:   I discussed test(s) with the performing physician.              Attending Attestation:   Physician Attestation Statement for Resident:  As the supervising MD   Physician Attestation Statement: I have personally seen and examined this patient.   I agree with the above history. -:   As the supervising MD I agree with the above PE.   -: Moderate lower abd tenderness, bilat but R>L, no peritoneal signs  nondistended  No cvat  Anxious, tearful   As the supervising MD I agree with the above treatment, course, plan, and disposition.   -: R/o ruptured cyst, torsion.  Is already s/p appy and hysterectomy.  No stool changes to suggest diverticulitis, and low suspicion for this.   Screening labs, pelvic u/s.    Endorsed at shift change pending u/s, re-eval.  If no answer and ongoing sxs, consider CT.  If u/s shows suspected dx and clinically improved, likely d/c home.    I have reviewed and agree with the residents interpretation of the following: lab data.  I have reviewed the following: old records at this facility.                    ED Course      Clinical Impression:   The primary encounter diagnosis was Abdominal pain, unspecified abdominal location. A diagnosis of Constipation, unspecified constipation type was also pertinent to this visit.    Disposition:   Disposition: Discharged  Condition: Stable  31yo female with acute RLQ pain. Ultrasound and CT abdomen/pelvis negative for acute pathology. Patient has pain which may be related to her physiologic ovarian cysts or large stool load. Will prescribe Ibuprofen and stool softener and have instructed the patient to follow up with her OBGYN to evaluate for possible elective oophorectomy.                        Maged Beckwith MD  10/09/17 0208

## 2017-12-27 ENCOUNTER — TELEPHONE (OUTPATIENT)
Dept: FAMILY MEDICINE | Facility: CLINIC | Age: 32
End: 2017-12-27

## 2017-12-27 NOTE — TELEPHONE ENCOUNTER
----- Message from Meeta Shaw sent at 12/27/2017 12:18 PM CST -----  Pt is requesting a call from nurse to discuss a same day apt for pain in right ear, vomiting, and flu.          Please call pt back at [phone]

## 2017-12-28 RX ORDER — ESCITALOPRAM OXALATE 10 MG/1
20 TABLET ORAL NIGHTLY
Qty: 30 TABLET | Refills: 12 | Status: SHIPPED | OUTPATIENT
Start: 2017-12-28 | End: 2018-12-26

## 2017-12-28 NOTE — TELEPHONE ENCOUNTER
----- Message from Carolyn Glass sent at 12/28/2017  1:03 PM CST -----  Contact: pt   Pt calling because she is out of medication and she needs her lexapro pills until she see dr on 01/10/2018,,,pt wanting to know if it's possible,,, , her previous dr has stopped taking her insurance and want do prescription,,,please call pt [phone] (G)

## 2018-11-20 ENCOUNTER — NURSE TRIAGE (OUTPATIENT)
Dept: ADMINISTRATIVE | Facility: CLINIC | Age: 33
End: 2018-11-20

## 2018-12-26 ENCOUNTER — LAB VISIT (OUTPATIENT)
Dept: LAB | Facility: HOSPITAL | Age: 33
End: 2018-12-26
Attending: FAMILY MEDICINE
Payer: COMMERCIAL

## 2018-12-26 ENCOUNTER — OFFICE VISIT (OUTPATIENT)
Dept: FAMILY MEDICINE | Facility: CLINIC | Age: 33
End: 2018-12-26
Payer: COMMERCIAL

## 2018-12-26 VITALS
WEIGHT: 154.38 LBS | TEMPERATURE: 98 F | DIASTOLIC BLOOD PRESSURE: 87 MMHG | SYSTOLIC BLOOD PRESSURE: 124 MMHG | BODY MASS INDEX: 26.36 KG/M2 | HEIGHT: 64 IN | HEART RATE: 86 BPM

## 2018-12-26 DIAGNOSIS — Z13.220 LIPID SCREENING: ICD-10-CM

## 2018-12-26 DIAGNOSIS — R53.83 FATIGUE, UNSPECIFIED TYPE: Primary | ICD-10-CM

## 2018-12-26 DIAGNOSIS — F43.10 PTSD (POST-TRAUMATIC STRESS DISORDER): ICD-10-CM

## 2018-12-26 DIAGNOSIS — F41.9 ANXIETY: ICD-10-CM

## 2018-12-26 DIAGNOSIS — R53.83 FATIGUE, UNSPECIFIED TYPE: ICD-10-CM

## 2018-12-26 LAB
ALBUMIN SERPL BCP-MCNC: 4.1 G/DL
ALP SERPL-CCNC: 67 U/L
ALT SERPL W/O P-5'-P-CCNC: 14 U/L
ANION GAP SERPL CALC-SCNC: 8 MMOL/L
AST SERPL-CCNC: 13 U/L
BASOPHILS # BLD AUTO: 0.04 K/UL
BASOPHILS NFR BLD: 0.6 %
BILIRUB SERPL-MCNC: 0.3 MG/DL
BUN SERPL-MCNC: 7 MG/DL
CALCIUM SERPL-MCNC: 9.1 MG/DL
CHLORIDE SERPL-SCNC: 103 MMOL/L
CHOLEST SERPL-MCNC: 184 MG/DL
CHOLEST/HDLC SERPL: 5 {RATIO}
CO2 SERPL-SCNC: 25 MMOL/L
CREAT SERPL-MCNC: 0.7 MG/DL
DIFFERENTIAL METHOD: NORMAL
EOSINOPHIL # BLD AUTO: 0.1 K/UL
EOSINOPHIL NFR BLD: 1.2 %
ERYTHROCYTE [DISTWIDTH] IN BLOOD BY AUTOMATED COUNT: 12.1 %
EST. GFR  (AFRICAN AMERICAN): >60 ML/MIN/1.73 M^2
EST. GFR  (NON AFRICAN AMERICAN): >60 ML/MIN/1.73 M^2
GLUCOSE SERPL-MCNC: 106 MG/DL
HCT VFR BLD AUTO: 42.8 %
HDLC SERPL-MCNC: 37 MG/DL
HDLC SERPL: 20.1 %
HGB BLD-MCNC: 14 G/DL
IMM GRANULOCYTES # BLD AUTO: 0.03 K/UL
IMM GRANULOCYTES NFR BLD AUTO: 0.4 %
LDLC SERPL CALC-MCNC: 89.8 MG/DL
LYMPHOCYTES # BLD AUTO: 2.3 K/UL
LYMPHOCYTES NFR BLD: 33.1 %
MCH RBC QN AUTO: 28 PG
MCHC RBC AUTO-ENTMCNC: 32.7 G/DL
MCV RBC AUTO: 86 FL
MONOCYTES # BLD AUTO: 0.5 K/UL
MONOCYTES NFR BLD: 6.7 %
NEUTROPHILS # BLD AUTO: 4 K/UL
NEUTROPHILS NFR BLD: 58 %
NONHDLC SERPL-MCNC: 147 MG/DL
NRBC BLD-RTO: 0 /100 WBC
PLATELET # BLD AUTO: 201 K/UL
PMV BLD AUTO: 11 FL
POTASSIUM SERPL-SCNC: 4.2 MMOL/L
PROT SERPL-MCNC: 7.7 G/DL
RBC # BLD AUTO: 5 M/UL
SODIUM SERPL-SCNC: 136 MMOL/L
TRIGL SERPL-MCNC: 286 MG/DL
TSH SERPL DL<=0.005 MIU/L-ACNC: 0.85 UIU/ML
WBC # BLD AUTO: 6.82 K/UL

## 2018-12-26 PROCEDURE — 99999 PR PBB SHADOW E&M-EST. PATIENT-LVL IV: CPT | Mod: PBBFAC,,, | Performed by: FAMILY MEDICINE

## 2018-12-26 PROCEDURE — 85025 COMPLETE CBC W/AUTO DIFF WBC: CPT

## 2018-12-26 PROCEDURE — 80061 LIPID PANEL: CPT

## 2018-12-26 PROCEDURE — 84443 ASSAY THYROID STIM HORMONE: CPT

## 2018-12-26 PROCEDURE — 99214 OFFICE O/P EST MOD 30 MIN: CPT | Mod: 25,S$GLB,, | Performed by: FAMILY MEDICINE

## 2018-12-26 PROCEDURE — 80053 COMPREHEN METABOLIC PANEL: CPT

## 2018-12-26 PROCEDURE — 90686 IIV4 VACC NO PRSV 0.5 ML IM: CPT | Mod: S$GLB,,, | Performed by: FAMILY MEDICINE

## 2018-12-26 PROCEDURE — 90471 IMMUNIZATION ADMIN: CPT | Mod: S$GLB,,, | Performed by: FAMILY MEDICINE

## 2018-12-26 PROCEDURE — 36415 COLL VENOUS BLD VENIPUNCTURE: CPT | Mod: PO

## 2018-12-26 RX ORDER — EPINEPHRINE 0.3 MG/.3ML
0.3 INJECTION SUBCUTANEOUS
COMMUNITY
Start: 2016-06-09

## 2018-12-26 RX ORDER — SERTRALINE HYDROCHLORIDE 25 MG/1
TABLET, FILM COATED ORAL
Qty: 21 TABLET | Refills: 0 | Status: SHIPPED | OUTPATIENT
Start: 2018-12-26 | End: 2019-01-11

## 2018-12-26 RX ORDER — HYDROXYZINE HYDROCHLORIDE 25 MG/1
25 TABLET, FILM COATED ORAL 3 TIMES DAILY PRN
Qty: 30 TABLET | Refills: 1 | Status: SHIPPED | OUTPATIENT
Start: 2018-12-26 | End: 2020-03-12

## 2018-12-26 RX ORDER — ESCITALOPRAM OXALATE 10 MG/1
20 TABLET ORAL NIGHTLY
COMMUNITY
Start: 2018-12-26 | End: 2019-01-11

## 2018-12-26 RX ORDER — SERTRALINE HYDROCHLORIDE 100 MG/1
100 TABLET, FILM COATED ORAL DAILY
Qty: 30 TABLET | Refills: 5 | Status: SHIPPED | OUTPATIENT
Start: 2018-12-26 | End: 2019-02-22 | Stop reason: SDUPTHER

## 2018-12-26 RX ORDER — CELECOXIB 200 MG/1
CAPSULE ORAL
COMMUNITY
Start: 2018-09-19 | End: 2019-02-19

## 2018-12-26 RX ORDER — BUTALBITAL, ACETAMINOPHEN AND CAFFEINE 50; 325; 40 MG/1; MG/1; MG/1
1 TABLET ORAL EVERY 6 HOURS PRN
COMMUNITY

## 2018-12-26 NOTE — PROGRESS NOTES
Patient presents with a complaint of anxiety and fatigue.  States she has had some panic attacks recently.  She has been on Lexapro chronically for many years for PTSD and anxiety.  States her father committed suicide in front of her when she was age 5.  She recently had an uncle committed suicide a few weeks ago which has triggered symptoms again.  She was having some anxiety even prior to this.  She was seeing psychiatrist previously, but they are no longer on her insurance.  She denies being depressed.  No SI/HI.    Past Medical History:  Past Medical History:   Diagnosis Date    Anxiety     Arteritis         DDD (degenerative disc disease), cervical     DDD (degenerative disc disease), lumbar     Endometriosis     Heart palpitations     History of lumbar spinal fusion     Pain, pelvic, female     PTSD (post-traumatic stress disorder)      Past Surgical History:   Procedure Laterality Date    APPENDECTOMY       SECTION, CLASSIC      x 3    CYSTOSCOPY N/A 10/13/2016    [facility]    DILATION AND CURETTAGE OF UTERUS      x 2    HYSTERECTOMY  10/15/2016    LUMBAR EPIDURAL INJECTION      multiple times over last eight yrs    LUMBAR FUSION  12/31/15    PELVIC LAPAROSCOPY      for endometriosis    RHIZOTOMY W/ RADIOFREQUENCY ABLATION      several ( back and neck ) over past 8 yrs    ROBOTIC ASSISTED LAPAROSCOPIC HYSTERECTOMY N/A 10/13/2016    [facility]    ROBOTIC ASSISTED LAPAROSCOPIC LYSIS OF ADHESIONS N/A 10/13/2016    [facility]    SALPINGECTOMY-ROBOTIC ASSISTED Bilateral 10/13/2016    [facility]    tonsillectomy      TONSILLECTOMY       Social History     Socioeconomic History    Marital status:      Spouse name: Not on file    Number of children: Not on file    Years of education: Not on file    Highest education level: Not on  file   Social Needs    Financial resource strain: Not on file    Food insecurity - worry: Not on file    Food insecurity - inability: Not on file    Transportation needs - medical: Not on file    Transportation needs - non-medical: Not on file   Occupational History    Not on file   Tobacco Use    Smoking status: Never Smoker    Smokeless tobacco: Never Used   Substance and Sexual Activity    Alcohol use: No    Drug use: No    Sexual activity: Yes   Other Topics Concern    Not on file   Social History Narrative    Not on file     Family History   Problem Relation Age of Onset    Hypertension Mother     Heart disease Mother     Hyperlipidemia Mother     Arthritis Mother     Depression Father     Suicide Father 32    No Known Problems Sister     No Known Problems Brother     No Known Problems Brother      Review of patient's allergies indicates:   Allergen Reactions    Venom-wasp     Ortho evra [norelgestromin-ethin.estradiol] Other (See Comments)     Chest pains    Eggs [egg derived] Diarrhea     And nausea     Current Outpatient Medications on File Prior to Visit   Medication Sig Dispense Refill    butalbital-acetaminophen-caffeine -40 mg (FIORICET, ESGIC) -40 mg per tablet Take 1 tablet by mouth every 6 (six) hours as needed for Headaches.       celecoxib (CELEBREX) 200 MG capsule       EPINEPHrine (EPIPEN 2-JOSE) 0.3 mg/0.3 mL AtIn Inject 0.3 mg into the muscle.      escitalopram oxalate (LEXAPRO) 10 MG tablet Take 2 tablets (20 mg total) by mouth every evening. For 1 week, then 1 tablet nightly for 1 week, then stop      [DISCONTINUED] escitalopram oxalate (LEXAPRO) 10 MG tablet Take 2 tablets (20 mg total) by mouth every evening. 30 tablet 12    [DISCONTINUED] fentanyl (DURAGESIC) 12 mcg/hr PT72 APPLY ONE PATCH EVERY 72 HOURS; q 48 hrs  0    [DISCONTINUED] gabapentin (NEURONTIN) 400 MG capsule Take 400 mg by mouth 3 (three) times daily.       [DISCONTINUED] meclizine  "(ANTIVERT) 25 mg tablet Take 1 tablet (25 mg total) by mouth 2 (two) times daily as needed for Dizziness or Nausea. 20 tablet 0    [DISCONTINUED] naproxen (NAPROSYN) 375 MG tablet Take 1 tablet (375 mg total) by mouth 2 (two) times daily with meals. 60 tablet 0    [DISCONTINUED] NORGESTIMATE-ETHINYL ESTRADIOL (ORTHO TRI-CYCLEN LO, 28, ORAL) Take 1 tablet by mouth every evening.      [DISCONTINUED] oxycodone-acetaminophen (PERCOCET)  mg per tablet Take 1 tablet by mouth 3 (three) times daily as needed.   0    [DISCONTINUED] polyethylene glycol (GLYCOLAX) 17 gram/dose powder Take 17 g by mouth once daily. 238 g 0    [DISCONTINUED] tizanidine (ZANAFLEX) 4 MG tablet Take 4 mg by mouth 2 (two) times daily as needed.   0    [DISCONTINUED] triamcinolone acetonide 0.5% (KENALOG) 0.5 % Crea Apply topically 2 (two) times daily. 30 g 0     No current facility-administered medications on file prior to visit.        ROS:  GENERAL: No fever, chills,  or significant weight changes.   CARDIOVASCULAR: Denies chest pain, PND, orthopnea or reduced exercise tolerance.  ABDOMEN: Appetite fine. Denies diarrhea, abdominal pain, hematemesis or blood in stool.  URINARY: No flank pain, dysuria or hematuria.        OBJECTIVE:     Vitals:    12/26/18 1043   BP: 124/87   Pulse: 86   Temp: 98.4 °F (36.9 °C)   TempSrc: Oral   Weight: 70 kg (154 lb 6.4 oz)   Height: 5' 4" (1.626 m)     Wt Readings from Last 3 Encounters:   12/26/18 70 kg (154 lb 6.4 oz)   09/28/18 70.2 kg (154 lb 12.2 oz)   10/07/17 65.8 kg (145 lb)     APPEARANCE: Well nourished, well developed, in no acute distress.    HEAD: Normocephalic.  Atraumatic.  No sinus tenderness.  EYES:   Right eye: Pupil reactive.  Conjunctiva clear.    Left eye: Pupil reactive.  Conjunctiva clear.    Both fundi:  Grossly normal to nondilated exam. EOMI.    EARS: TM's intact. Light reflex normal. No retraction or perforation.    NOSE:  clear.  MOUTH & THROAT:  No pharyngeal erythema or " exudate. No lesions.  NECK: Supple. No bruits.  No JVD.  No cervical lymphadenopathy.  No thyromegaly.    CHEST: Breath sounds clear bilaterally.  Normal respiratory effort  CARDIOVASCULAR: Normal rate.  Regular rhythm.  No murmurs.  No rub.  No gallops.  ABDOMEN: Bowel sounds normal.  Soft.  No tenderness.  No organomegaly.  PERIPHERAL VASCULAR: No cyanosis.  No clubbing.  No edema.  NEUROLOGIC: No focal findings.  MENTAL STATUS: Alert.  Oriented x 3.          Ludy was seen today for anxiety.    Diagnoses and all orders for this visit:    Fatigue, unspecified type  -     Comprehensive metabolic panel; Future  -     TSH; Future  -     CBC auto differential; Future    PTSD (post-traumatic stress disorder)  -     Ambulatory referral to Psychiatry    Anxiety  -     Ambulatory referral to Psychiatry    Lipid screening  -     Lipid panel; Future    Other orders  -     sertraline (ZOLOFT) 25 MG tablet; Take 1 daily for 1 week, then take 2 daily for 1 week, then start 100 mg pills  -     hydrOXYzine HCl (ATARAX) 25 MG tablet; Take 1 tablet (25 mg total) by mouth 3 (three) times daily as needed for Anxiety.  -     sertraline (ZOLOFT) 100 MG tablet; Take 1 tablet (100 mg total) by mouth once daily.  -     Influenza - Quadrivalent (3 years & older) (PF)     Taper Lexapro while starting Zoloft.  Follow-up appointment Dr. Lemos in 5-6 weeks and as needed prior to this.

## 2019-01-11 ENCOUNTER — OFFICE VISIT (OUTPATIENT)
Dept: FAMILY MEDICINE | Facility: CLINIC | Age: 34
End: 2019-01-11
Payer: COMMERCIAL

## 2019-01-11 VITALS
DIASTOLIC BLOOD PRESSURE: 66 MMHG | TEMPERATURE: 99 F | HEIGHT: 64 IN | WEIGHT: 150.19 LBS | BODY MASS INDEX: 25.64 KG/M2 | SYSTOLIC BLOOD PRESSURE: 117 MMHG | HEART RATE: 70 BPM

## 2019-01-11 DIAGNOSIS — J06.9 VIRAL UPPER RESPIRATORY TRACT INFECTION WITH COUGH: Primary | ICD-10-CM

## 2019-01-11 PROCEDURE — 99999 PR PBB SHADOW E&M-EST. PATIENT-LVL III: ICD-10-PCS | Mod: PBBFAC,,, | Performed by: NURSE PRACTITIONER

## 2019-01-11 PROCEDURE — 99213 OFFICE O/P EST LOW 20 MIN: CPT | Mod: S$GLB,,, | Performed by: NURSE PRACTITIONER

## 2019-01-11 PROCEDURE — 99999 PR PBB SHADOW E&M-EST. PATIENT-LVL III: CPT | Mod: PBBFAC,,, | Performed by: NURSE PRACTITIONER

## 2019-01-11 PROCEDURE — 99213 PR OFFICE/OUTPT VISIT, EST, LEVL III, 20-29 MIN: ICD-10-PCS | Mod: S$GLB,,, | Performed by: NURSE PRACTITIONER

## 2019-01-11 RX ORDER — METHYLPREDNISOLONE 4 MG/1
TABLET ORAL
Qty: 1 PACKAGE | Refills: 0 | Status: SHIPPED | OUTPATIENT
Start: 2019-01-11 | End: 2019-12-12

## 2019-01-11 RX ORDER — PROMETHAZINE HYDROCHLORIDE AND DEXTROMETHORPHAN HYDROBROMIDE 6.25; 15 MG/5ML; MG/5ML
5 SYRUP ORAL 3 TIMES DAILY PRN
Qty: 118 ML | Refills: 0 | Status: SHIPPED | OUTPATIENT
Start: 2019-01-11 | End: 2019-01-21

## 2019-01-11 NOTE — PROGRESS NOTES
Subjective:       Patient ID: Ludy Carney is a 33 y.o. female.    Chief Complaint: Sinus Problem and Hoarse    URI    This is a new problem. The current episode started in the past 7 days. The problem has been gradually worsening. There has been no fever. Associated symptoms include congestion, coughing and rhinorrhea. Pertinent negatives include no abdominal pain, chest pain, diarrhea, ear pain, headaches, rash, sore throat or vomiting. She has tried acetaminophen and antihistamine for the symptoms. The treatment provided no relief.       Review of Systems   Constitutional: Negative for fatigue, fever and unexpected weight change.   HENT: Positive for congestion and rhinorrhea. Negative for ear pain and sore throat.    Eyes: Negative for pain and visual disturbance.   Respiratory: Positive for cough. Negative for shortness of breath.    Cardiovascular: Negative for chest pain and palpitations.   Gastrointestinal: Negative for abdominal pain, diarrhea and vomiting.   Musculoskeletal: Negative for arthralgias and myalgias.   Skin: Negative for color change and rash.   Neurological: Negative for dizziness and headaches.   Psychiatric/Behavioral: Negative for dysphoric mood and sleep disturbance. The patient is not nervous/anxious.        Vitals:    01/11/19 1026   BP: 117/66   Pulse: 70   Temp: 98.6 °F (37 °C)       Objective:     Current Outpatient Medications   Medication Sig Dispense Refill    butalbital-acetaminophen-caffeine -40 mg (FIORICET, ESGIC) -40 mg per tablet Take 1 tablet by mouth every 6 (six) hours as needed for Headaches.       celecoxib (CELEBREX) 200 MG capsule       EPINEPHrine (EPIPEN 2-JOSE) 0.3 mg/0.3 mL AtIn Inject 0.3 mg into the muscle.      hydrOXYzine HCl (ATARAX) 25 MG tablet Take 1 tablet (25 mg total) by mouth 3 (three) times daily as needed for Anxiety. 30 tablet 1    sertraline (ZOLOFT) 100 MG tablet Take 1 tablet (100 mg total) by mouth once daily. 30  tablet 5    methylPREDNISolone (MEDROL DOSEPACK) 4 mg tablet use as directed 1 Package 0    promethazine-dextromethorphan (PROMETHAZINE-DM) 6.25-15 mg/5 mL Syrp Take 5 mLs by mouth 3 (three) times daily as needed. 118 mL 0     No current facility-administered medications for this visit.        Physical Exam   Constitutional: She is oriented to person, place, and time. She appears well-developed and well-nourished. No distress.   HENT:   Head: Normocephalic and atraumatic.   Right Ear: Tympanic membrane normal.   Left Ear: Tympanic membrane normal.   Nose: Mucosal edema and rhinorrhea present.   Mouth/Throat: Posterior oropharyngeal edema present.   Eyes: EOM are normal. Pupils are equal, round, and reactive to light.   Neck: Normal range of motion. Neck supple.   Cardiovascular: Normal rate and regular rhythm.   Pulmonary/Chest: Effort normal and breath sounds normal.   Loose cough   Musculoskeletal: Normal range of motion.   Neurological: She is alert and oriented to person, place, and time.   Skin: Skin is warm and dry. No rash noted.   Psychiatric: She has a normal mood and affect. Judgment normal.   Nursing note and vitals reviewed.      Assessment:       1. Viral upper respiratory tract infection with cough        Plan:   Viral upper respiratory tract infection with cough    Other orders  -     methylPREDNISolone (MEDROL DOSEPACK) 4 mg tablet; use as directed  Dispense: 1 Package; Refill: 0  -     promethazine-dextromethorphan (PROMETHAZINE-DM) 6.25-15 mg/5 mL Syrp; Take 5 mLs by mouth 3 (three) times daily as needed.  Dispense: 118 mL; Refill: 0        Follow-up if symptoms worsen or fail to improve.    There are no Patient Instructions on file for this visit.

## 2019-02-19 ENCOUNTER — TELEPHONE (OUTPATIENT)
Dept: FAMILY MEDICINE | Facility: CLINIC | Age: 34
End: 2019-02-19

## 2019-02-19 ENCOUNTER — OFFICE VISIT (OUTPATIENT)
Dept: FAMILY MEDICINE | Facility: CLINIC | Age: 34
End: 2019-02-19
Payer: COMMERCIAL

## 2019-02-19 VITALS
HEIGHT: 64 IN | BODY MASS INDEX: 25.24 KG/M2 | WEIGHT: 147.81 LBS | TEMPERATURE: 99 F | DIASTOLIC BLOOD PRESSURE: 86 MMHG | HEART RATE: 104 BPM | SYSTOLIC BLOOD PRESSURE: 119 MMHG

## 2019-02-19 DIAGNOSIS — D84.9 IMMUNE DEFICIENCY DISORDER: Primary | ICD-10-CM

## 2019-02-19 DIAGNOSIS — D84.9 IMMUNE DEFICIENCY DISORDER: ICD-10-CM

## 2019-02-19 DIAGNOSIS — I77.6 AORTITIS: ICD-10-CM

## 2019-02-19 DIAGNOSIS — J06.9 UPPER RESPIRATORY TRACT INFECTION, UNSPECIFIED TYPE: Primary | ICD-10-CM

## 2019-02-19 PROCEDURE — 99214 PR OFFICE/OUTPT VISIT, EST, LEVL IV, 30-39 MIN: ICD-10-PCS | Mod: S$GLB,,, | Performed by: FAMILY MEDICINE

## 2019-02-19 PROCEDURE — 99999 PR PBB SHADOW E&M-EST. PATIENT-LVL III: ICD-10-PCS | Mod: PBBFAC,,, | Performed by: FAMILY MEDICINE

## 2019-02-19 PROCEDURE — 99214 OFFICE O/P EST MOD 30 MIN: CPT | Mod: S$GLB,,, | Performed by: FAMILY MEDICINE

## 2019-02-19 PROCEDURE — 99999 PR PBB SHADOW E&M-EST. PATIENT-LVL III: CPT | Mod: PBBFAC,,, | Performed by: FAMILY MEDICINE

## 2019-02-19 RX ORDER — IPRATROPIUM BROMIDE 0.5 MG/2.5ML
500 SOLUTION RESPIRATORY (INHALATION) 4 TIMES DAILY
Qty: 1 BOX | Refills: 6 | Status: SHIPPED | OUTPATIENT
Start: 2019-02-19 | End: 2020-03-12

## 2019-02-19 RX ORDER — AMOXICILLIN AND CLAVULANATE POTASSIUM 875; 125 MG/1; MG/1
1 TABLET, FILM COATED ORAL 2 TIMES DAILY
Refills: 0 | COMMUNITY
Start: 2019-02-14 | End: 2019-12-12

## 2019-02-19 RX ORDER — MONTELUKAST SODIUM 10 MG/1
10 TABLET ORAL NIGHTLY
Qty: 30 TABLET | Refills: 0 | Status: SHIPPED | OUTPATIENT
Start: 2019-02-19 | End: 2019-04-13 | Stop reason: SDUPTHER

## 2019-02-19 RX ORDER — PROMETHAZINE HYDROCHLORIDE AND DEXTROMETHORPHAN HYDROBROMIDE 6.25; 15 MG/5ML; MG/5ML
SYRUP ORAL
COMMUNITY
End: 2020-03-12

## 2019-02-19 NOTE — PROGRESS NOTES
Ludy Carney presents with moderate upper respiratory congestion,rhinnorhea,moderate cough past 2-3 days. OTC help slightly. Denies nausea,vomiting,diarrhea or significant fever.  Hx of recurrent URI as well as Aortitis   Past Medical History:   Diagnosis Date    Anxiety     Arteritis         DDD (degenerative disc disease), cervical     DDD (degenerative disc disease), lumbar     Endometriosis     Heart palpitations     History of lumbar spinal fusion     Pain, pelvic, female     PTSD (post-traumatic stress disorder)      Past Surgical History:   Procedure Laterality Date    APPENDECTOMY       SECTION, CLASSIC      x 3    CYSTOSCOPY N/A 10/13/2016    Performed by [facility] at [facility] OR    DILATION AND CURETTAGE OF UTERUS      x 2    HYSTERECTOMY  10/15/2016    LUMBAR EPIDURAL INJECTION      multiple times over last eight yrs    LUMBAR FUSION  12/31/15    PELVIC LAPAROSCOPY      for endometriosis    RHIZOTOMY W/ RADIOFREQUENCY ABLATION      several ( back and neck ) over past 8 yrs    ROBOTIC ASSISTED LAPAROSCOPIC HYSTERECTOMY N/A 10/13/2016    [facility] at [facility] OR    ROBOTIC ASSISTED LAPAROSCOPIC LYSIS OF ADHESIONS N/A 10/13/2016    [facility] at [facility] OR    SALPINGECTOMY-ROBOTIC ASSISTED Bilateral 10/13/2016    [facility] at [facility] OR    tonsillectomy      TONSILLECTOMY       Review of patient's allergies indicates:   Allergen Reactions    Venom-wasp     Ortho evra [norelgestromin-ethin.estradiol] Other (See Comments)     Chest pains    Eggs [egg derived] Diarrhea     And nausea     Current Outpatient Medications on File Prior to Visit   Medication Sig Dispense Refill    amoxicillin-clavulanate 875-125mg (AUGMENTIN) 875-125 mg per tablet Take 1 tablet by mouth 2 (two) times daily.  0    butalbital-acetaminophen-caffeine -40 mg (FIORICET, ESGIC) -40 mg per tablet Take 1  tablet by mouth every 6 (six) hours as needed for Headaches.       EPINEPHrine (EPIPEN 2-JOSE) 0.3 mg/0.3 mL AtIn Inject 0.3 mg into the muscle.      hydrOXYzine HCl (ATARAX) 25 MG tablet Take 1 tablet (25 mg total) by mouth 3 (three) times daily as needed for Anxiety. 30 tablet 1    promethazine-dextromethorphan (PROMETHAZINE-DM) 6.25-15 mg/5 mL Syrp promethazine-DM 6.25 mg-15 mg/5 mL oral syrup   TAKE 5 MILLILITERS (ONE TEASPOONFUL) BY MOUTH EVERY FOUR TO SIX HOURS AS NEEDED      sertraline (ZOLOFT) 100 MG tablet Take 1 tablet (100 mg total) by mouth once daily. 30 tablet 5    methylPREDNISolone (MEDROL DOSEPACK) 4 mg tablet use as directed 1 Package 0    [DISCONTINUED] celecoxib (CELEBREX) 200 MG capsule        No current facility-administered medications on file prior to visit.      Social History     Socioeconomic History    Marital status:      Spouse name: Not on file    Number of children: Not on file    Years of education: Not on file    Highest education level: Not on file   Social Needs    Financial resource strain: Not on file    Food insecurity - worry: Not on file    Food insecurity - inability: Not on file    Transportation needs - medical: Not on file    Transportation needs - non-medical: Not on file   Occupational History    Not on file   Tobacco Use    Smoking status: Never Smoker    Smokeless tobacco: Never Used   Substance and Sexual Activity    Alcohol use: No    Drug use: No    Sexual activity: Yes   Other Topics Concern    Not on file   Social History Narrative    Not on file     Family History   Problem Relation Age of Onset    Hypertension Mother     Heart disease Mother     Hyperlipidemia Mother     Arthritis Mother     Depression Father     Suicide Father 32    No Known Problems Sister     No Known Problems Brother     No Known Problems Brother          ROS:  SKIN: No rashes, itching or changes in color or texture of skin.  EYES: Visual acuity fine.  No photophobia, ocular pain or diplopia.EARS: Denies ear pain, discharge or vertigo.NOSE: No loss of smell, no epistaxis some postnasal drip.MOUTH & THROAT: No hoarseness or change in voice. No excessive gum bleeding.CHEST: Denies JACOBS, cyanosis, wheezing  CARDIOVASCULAR: Denies chest pain, PND, orthopnea or reduced exercise tolerance.  ABDOMEN:  No weight loss.No abdominal pain, no hematemesis or blood in stool.  URINARY: No flank pain, dysuria or hematuria.  PERIPHERAL VASCULAR: No claudication or cyanosis.  MUSCULOSKELETAL: Negative   NEUROLOGIC: No history of seizures, paralysis, alteration of gait or coordination.    PE: Vital signs as noted  Heent:Normocephalic with no recent cranial trauma,PERRLA,EOMI,conjunctiva clear,fundi reveal no hemmorhage exudate or papilledema.Otic canals clear, tympanic membranes slightly dull bilaterally.Nasal mucosa slightly red and edematous.Posterior pharynx slightly red but without exudate.  Neck:Supple with minimal anterior cervical adenopathy.  Chest:Clear bilateral breath sounds with mild scattered ronchi  Heart:Regular rhthym without murmer  Abdomen:Soft, non tender,no masses, no hepatosplenomegalyExtremeties and Neurologic:Grossly within normal limits  Impression: Upper Respiratory Infection. 465.9  Poss immune deficient  Hx aortitis   Plan:   H1H2 otc  Atrovent MDI qid w instruction on use  Rx montelukast  Trying to avoid steroids for now   Allergy/Immunology  consult

## 2019-02-19 NOTE — TELEPHONE ENCOUNTER
ipratropium (ATROVENT HFA) 17 mcg/actuation will cost the pt $200 asking if the rx can be changed to a neb solution which will be cheaper

## 2019-02-19 NOTE — TELEPHONE ENCOUNTER
----- Message from Misty Whatley sent at 2/19/2019 11:40 AM CST -----  Contact: pt  `pt request call back wants to know if she can get the nebulizer for breathing treatment form , other medication if expensive even with insurance ...call back number : [phone] (home)

## 2019-02-20 ENCOUNTER — TELEPHONE (OUTPATIENT)
Dept: FAMILY MEDICINE | Facility: CLINIC | Age: 34
End: 2019-02-20

## 2019-02-20 RX ORDER — DOXYCYCLINE 100 MG/1
100 CAPSULE ORAL 2 TIMES DAILY
Qty: 20 CAPSULE | Refills: 0 | Status: SHIPPED | OUTPATIENT
Start: 2019-02-20 | End: 2020-03-12

## 2019-02-20 NOTE — TELEPHONE ENCOUNTER
Pt states this morning she had a coughing spell that felt like her lungs were being squeezed with intense pain and was unable to take in a breath. States the breathing is better but is still having the pain. Asking for recommendations, she has been able to get in about 3 breathing treatments since yesterday afternoon.

## 2019-02-20 NOTE — TELEPHONE ENCOUNTER
----- Message from Lisa Hastings sent at 2/20/2019  8:45 AM CST -----  Contact: self  Type:  Needs Medical Advice    Who Called: slim delacruz  Symptoms (please be specific): can't breath while coughing/felt as though lungs are being squeezed/lasted abt 1 min/pain while breathing  How long has patient had these symptoms:  Was seen on 2/19/19 for problem  Pharmacy name and phone #:    [facility] [place] [address] [facility] [address]  Phone: [phone] Fax: [phone]  Would the patient rather a call back or a response via [facility]? call  Best Call Back Number: [phone]  Additional Information:

## 2019-02-22 ENCOUNTER — TELEPHONE (OUTPATIENT)
Dept: FAMILY MEDICINE | Facility: CLINIC | Age: 34
End: 2019-02-22

## 2019-02-22 RX ORDER — SERTRALINE HYDROCHLORIDE 100 MG/1
100 TABLET, FILM COATED ORAL DAILY
Qty: 90 TABLET | Refills: 3 | Status: SHIPPED | OUTPATIENT
Start: 2019-02-22 | End: 2020-01-02

## 2019-02-22 NOTE — TELEPHONE ENCOUNTER
----- Message from Liudmila Glass sent at 2/22/2019 10:07 AM CST -----  Contact: [phone]  Would like to consult with the nurse, patients states that' she is having a lot of symptoms and would like to speak with the nurse concerning this, please call  back at [phone], thank sj

## 2019-03-05 ENCOUNTER — TELEPHONE (OUTPATIENT)
Dept: FAMILY MEDICINE | Facility: CLINIC | Age: 34
End: 2019-03-05

## 2019-03-05 NOTE — TELEPHONE ENCOUNTER
----- Message from Thi Padron sent at 3/5/2019  1:41 PM CST -----  Contact: [facility]  needs call back regarding status of montelukast tabs request, please return call to pharmacy at [phone], reference nmbr is [id number]

## 2019-04-15 RX ORDER — MONTELUKAST SODIUM 10 MG/1
TABLET ORAL
Qty: 30 TABLET | Refills: 12 | Status: SHIPPED | OUTPATIENT
Start: 2019-04-15 | End: 2019-09-05 | Stop reason: SDUPTHER

## 2019-04-16 ENCOUNTER — PATIENT MESSAGE (OUTPATIENT)
Dept: RHEUMATOLOGY | Facility: CLINIC | Age: 34
End: 2019-04-16

## 2019-04-25 ENCOUNTER — OFFICE VISIT (OUTPATIENT)
Dept: RHEUMATOLOGY | Facility: CLINIC | Age: 34
End: 2019-04-25
Payer: COMMERCIAL

## 2019-04-25 VITALS
HEIGHT: 64 IN | BODY MASS INDEX: 25.78 KG/M2 | WEIGHT: 151 LBS | HEART RATE: 89 BPM | SYSTOLIC BLOOD PRESSURE: 129 MMHG | DIASTOLIC BLOOD PRESSURE: 75 MMHG

## 2019-04-25 DIAGNOSIS — D80.8 IGE DEFICIENCY: Primary | ICD-10-CM

## 2019-04-25 PROCEDURE — 99999 PR PBB SHADOW E&M-EST. PATIENT-LVL III: CPT | Mod: PBBFAC,,, | Performed by: INTERNAL MEDICINE

## 2019-04-25 PROCEDURE — 99999 PR PBB SHADOW E&M-EST. PATIENT-LVL III: ICD-10-PCS | Mod: PBBFAC,,, | Performed by: INTERNAL MEDICINE

## 2019-04-25 PROCEDURE — 99214 PR OFFICE/OUTPT VISIT, EST, LEVL IV, 30-39 MIN: ICD-10-PCS | Mod: S$GLB,,, | Performed by: INTERNAL MEDICINE

## 2019-04-25 PROCEDURE — 99214 OFFICE O/P EST MOD 30 MIN: CPT | Mod: S$GLB,,, | Performed by: INTERNAL MEDICINE

## 2019-04-25 RX ORDER — TIZANIDINE 4 MG/1
TABLET ORAL
COMMUNITY
Start: 2019-02-27 | End: 2019-10-28 | Stop reason: SDUPTHER

## 2019-04-25 RX ORDER — FLUTICASONE PROPIONATE 50 MCG
SPRAY, SUSPENSION (ML) NASAL
COMMUNITY

## 2019-04-25 RX ORDER — CELECOXIB 100 MG/1
100 CAPSULE ORAL DAILY
COMMUNITY
End: 2019-12-12

## 2019-04-25 NOTE — PROGRESS NOTES
RHEUMATOLOGY CLINIC FOLLOW UP VISIT  Chief complaints:-  I have been having recurrent infections.  I want to rule out any underlying autoimmune disorder.    HPI:-  Ludy Osborne a 34 y.o. pleasant female comes in for a follow up visit.  She comes in today since she has been having recurrent infections and wanted to make sure there is no underlying autoimmune disorder.  Her symptoms have been unchanged in the past 2 years since I have seen her in 2016.  She has recurrent infections especially sinopulmonary infections requiring chronic antibiotic use.  No photosensitive malar rash, sicca syndrome, Raynaud's phenomenon, treatment resistant headaches, seizures, psychosis, small joint swelling associated with prolonged stiffness.  She has chronic joint pains associated with chronic fatigue over multiple large and small joints.    Review of Systems   Constitutional: Negative for chills and fever.   HENT: Negative for congestion and sore throat.    Eyes: Negative for blurred vision and redness.   Respiratory: Negative for cough and shortness of breath.    Cardiovascular: Negative for chest pain and leg swelling.   Gastrointestinal: Negative for abdominal pain.   Genitourinary: Negative for dysuria.   Musculoskeletal: Positive for back pain, joint pain, myalgias and neck pain. Negative for falls.   Skin: Negative for rash.   Neurological: Negative for headaches.   Endo/Heme/Allergies: Positive for environmental allergies. Does not bruise/bleed easily.   Psychiatric/Behavioral: Negative for memory loss. The patient does not have insomnia.        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    Arteritis     2015    DDD (degenerative disc disease), cervical     DDD (degenerative disc disease), lumbar     Endometriosis     Heart palpitations     History of lumbar spinal fusion     Pain, pelvic, female     PTSD  "(post-traumatic stress disorder)        Past Surgical History:   Procedure Laterality Date    APPENDECTOMY       SECTION, CLASSIC      x 3    CYSTOSCOPY N/A 10/13/2016    [facility]    DILATION AND CURETTAGE OF UTERUS      x 2    HYSTERECTOMY  10/15/2016    LUMBAR EPIDURAL INJECTION      multiple times over last eight yrs    LUMBAR FUSION  12/31/15    PELVIC LAPAROSCOPY      for endometriosis    RHIZOTOMY W/ RADIOFREQUENCY ABLATION      several ( back and neck ) over past 8 yrs    ROBOTIC ASSISTED LAPAROSCOPIC HYSTERECTOMY N/A 10/13/2016    [facility]    ROBOTIC ASSISTED LAPAROSCOPIC LYSIS OF ADHESIONS N/A 10/13/2016    [facility] OR    SALPINGECTOMY-ROBOTIC ASSISTED Bilateral 10/13/2016    [facility] OR    tonsillectomy      TONSILLECTOMY          Social History     Tobacco Use    Smoking status: Never Smoker    Smokeless tobacco: Never Used   Substance Use Topics    Alcohol use: No    Drug use: No       Family History   Problem Relation Age of Onset    Hypertension Mother     Heart disease Mother     Hyperlipidemia Mother     Arthritis Mother     Depression Father     Suicide Father 32    No Known Problems Sister     No Known Problems Brother     No Known Problems Brother        Review of patient's allergies indicates:   Allergen Reactions    Venom-wasp     Ortho evra [norelgestromin-ethin.estradiol] Other (See Comments)     Chest pains    Eggs [egg derived] Diarrhea     And nausea       Vitals:    19 0955   BP: 129/75   Pulse: 89   Weight: 68.5 kg (151 lb 0.2 oz)   Height: 5' 4" (1.626 m)   PainSc:   5   PainLoc: Back       Physical Exam   Constitutional: She is oriented to person, place, and time and well-developed, well-nourished, and in no distress. No distress.   HENT:   Head: Normocephalic.   Mouth/Throat: Oropharynx is clear and " moist.   Eyes: Pupils are equal, round, and reactive to light. Conjunctivae and EOM are normal.   Neck: Normal range of motion. Neck supple.   Cardiovascular: Normal rate and intact distal pulses.   Pulmonary/Chest: Effort normal. No respiratory distress.   Abdominal: Soft. There is no tenderness.   Musculoskeletal:   No synovitis over small joints of hands or feet.  No effusion over large joints.   Neurological: She is alert and oriented to person, place, and time. No cranial nerve deficit.   Skin: Skin is warm. No rash noted. No erythema.   Psychiatric: Mood and affect normal.   Nursing note and vitals reviewed.      Medication List with Changes/Refills   Current Medications    AMOXICILLIN-CLAVULANATE 875-125MG (AUGMENTIN) 875-125 MG PER TABLET    Take 1 tablet by mouth 2 (two) times daily.    BUTALBITAL-ACETAMINOPHEN-CAFFEINE -40 MG (FIORICET, ESGIC) -40 MG PER TABLET    Take 1 tablet by mouth every 6 (six) hours as needed for Headaches.     CELECOXIB (CELEBREX) 100 MG CAPSULE    Take 100 mg by mouth once daily.    DOXYCYCLINE (VIBRAMYCIN) 100 MG CAP    Take 1 capsule (100 mg total) by mouth 2 (two) times daily.    EPINEPHRINE (EPIPEN 2-JOSE) 0.3 MG/0.3 ML ATIN    Inject 0.3 mg into the muscle.    FLUTICASONE (FLONASE) 50 MCG/ACTUATION NASAL SPRAY    1 spray in each nostril    FLUTICASONE FUROATE (ARNUITY ELLIPTA) 200 MCG/ACTUATION DSDV    1 puff    HYDROXYZINE HCL (ATARAX) 25 MG TABLET    Take 1 tablet (25 mg total) by mouth 3 (three) times daily as needed for Anxiety.    IPRATROPIUM (ATROVENT) 0.02 % NEBULIZER SOLUTION    Take 2.5 mLs (500 mcg total) by nebulization 4 (four) times daily. Rescue    METHYLPREDNISOLONE (MEDROL DOSEPACK) 4 MG TABLET    use as directed    MONTELUKAST (SINGULAIR) 10 MG TABLET    TAKE 1 TABLET BY MOUTH EVERY EVENING    PROMETHAZINE-DEXTROMETHORPHAN (PROMETHAZINE-DM) 6.25-15 MG/5 ML SYRP    promethazine-DM 6.25 mg-15 mg/5 mL oral syrup   TAKE 5 MILLILITERS (ONE TEASPOONFUL)  BY MOUTH EVERY FOUR TO SIX HOURS AS NEEDED    SERTRALINE (ZOLOFT) 100 MG TABLET    Take 1 tablet (100 mg total) by mouth once daily.    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  Advised to follow up with immunologist as recommended by .  Chronic arthralgias and chronic fatigue have been associated with selective IgE deficiency.  # Follow up if symptoms worsen or fail to improve.      Disclaimer: This note was prepared using voice recognition system and is likely to have sound alike errors and is not proof read.  Please call me with any questions.

## 2019-04-25 NOTE — ASSESSMENT & PLAN NOTE
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  Advised to follow up with immunologist as recommended by .  Chronic arthralgias and chronic fatigue have been associated with selective IgE deficiency.

## 2019-09-05 RX ORDER — MONTELUKAST SODIUM 10 MG/1
10 TABLET ORAL NIGHTLY
Qty: 90 TABLET | Refills: 4 | Status: SHIPPED | OUTPATIENT
Start: 2019-09-05 | End: 2020-08-19

## 2019-09-05 NOTE — TELEPHONE ENCOUNTER
----- Message from Jennie Yao sent at 9/5/2019  4:26 PM CDT -----  Contact: [facility]  .Type:  RX Refill Request Requesting Authorization for Refill ReF#[id number]    Who Called:  [facility]   Refill or New Rx: Refill   RX Name and Strength: montelukast (SINGULAIR) 10 mg tablet  How is the patient currently taking it? (ex. 1XDay): TAKE 1 TABLET BY MOUTH EVERY EVENING  Is this a 30 day or 90 day RX: 30 say   Preferred Pharmacy with phone number: .  [facility] - [address]  [address]  [facility] [address]  Phone: [phone] Fax: [phone]  Local or Mail Order: Local   Ordering Provider: Dr. Lemos   Would the patient rather a call back or a response via [facility]?  Call Back   Best Call Back Number: [phone]          .Thank You  Jennie Yao

## 2019-10-08 ENCOUNTER — TELEPHONE (OUTPATIENT)
Dept: FAMILY MEDICINE | Facility: CLINIC | Age: 34
End: 2019-10-08

## 2019-10-08 NOTE — TELEPHONE ENCOUNTER
Spoke with patient, based on patient symptoms, recommended she be evaluated by emergency department

## 2019-10-08 NOTE — TELEPHONE ENCOUNTER
----- Message from Naida Cruz sent at 10/8/2019  3:38 PM CDT -----  Contact: self  Patient requesting a call back. She states that about 30 minutes ago she stood up and started walking then she started going to her left. She states that her vision and eyes also went left. And her left side started tingling. Please call patient back at [phone]

## 2019-10-09 LAB
CHOLEST SERPL-MSCNC: 197 MG/DL (ref 0–200)
HDL/CHOLESTEROL RATIO: 6.2
HDLC SERPL-MCNC: 32 MG/DL
LDLC SERPL CALC-MCNC: 85 MG/DL
TRIGL SERPL-MCNC: 402 MG/DL

## 2019-10-15 ENCOUNTER — PATIENT OUTREACH (OUTPATIENT)
Dept: ADMINISTRATIVE | Facility: CLINIC | Age: 34
End: 2019-10-15

## 2019-10-15 RX ORDER — TOPIRAMATE 25 MG/1
25 TABLET ORAL 2 TIMES DAILY
COMMUNITY
End: 2020-03-12

## 2019-10-15 RX ORDER — SUMATRIPTAN 50 MG/1
50 TABLET, FILM COATED ORAL
COMMUNITY
End: 2020-03-12 | Stop reason: SINTOL

## 2019-10-15 NOTE — PATIENT INSTRUCTIONS

## 2019-10-22 ENCOUNTER — OFFICE VISIT (OUTPATIENT)
Dept: FAMILY MEDICINE | Facility: CLINIC | Age: 34
End: 2019-10-22
Payer: COMMERCIAL

## 2019-10-22 VITALS
HEIGHT: 64 IN | SYSTOLIC BLOOD PRESSURE: 106 MMHG | BODY MASS INDEX: 27.14 KG/M2 | DIASTOLIC BLOOD PRESSURE: 69 MMHG | TEMPERATURE: 100 F | WEIGHT: 159 LBS | HEART RATE: 75 BPM

## 2019-10-22 DIAGNOSIS — G43.909 MIGRAINE WITHOUT STATUS MIGRAINOSUS, NOT INTRACTABLE, UNSPECIFIED MIGRAINE TYPE: Primary | ICD-10-CM

## 2019-10-22 PROCEDURE — 99999 PR PBB SHADOW E&M-EST. PATIENT-LVL IV: ICD-10-PCS | Mod: PBBFAC,,, | Performed by: FAMILY MEDICINE

## 2019-10-22 PROCEDURE — 99214 OFFICE O/P EST MOD 30 MIN: CPT | Mod: S$GLB,,, | Performed by: FAMILY MEDICINE

## 2019-10-22 PROCEDURE — 99214 PR OFFICE/OUTPT VISIT, EST, LEVL IV, 30-39 MIN: ICD-10-PCS | Mod: S$GLB,,, | Performed by: FAMILY MEDICINE

## 2019-10-22 PROCEDURE — 99999 PR PBB SHADOW E&M-EST. PATIENT-LVL IV: CPT | Mod: PBBFAC,,, | Performed by: FAMILY MEDICINE

## 2019-10-22 NOTE — PROGRESS NOTES
The patient presents today to fu recent complex HA assoc c transient neuro symptoms. HUTSON at [facility] neg    Past Medical History:  Past Medical History:   Diagnosis Date    Anxiety     Aortitis 2016    Arteritis         DDD (degenerative disc disease), cervical     DDD (degenerative disc disease), lumbar     Endometriosis     Heart palpitations     History of lumbar spinal fusion     Pain, pelvic, female     PTSD (post-traumatic stress disorder)      Past Surgical History:   Procedure Laterality Date    APPENDECTOMY       SECTION, CLASSIC      x 3    DILATION AND CURETTAGE OF UTERUS      x 2    HYSTERECTOMY  10/15/2016    LUMBAR EPIDURAL INJECTION      multiple times over last eight yrs    LUMBAR FUSION  12/31/15    PELVIC LAPAROSCOPY      for endometriosis    RHIZOTOMY W/ RADIOFREQUENCY ABLATION      several ( back and neck ) over past 8 yrs    tonsillectomy      TONSILLECTOMY       Review of patient's allergies indicates:   Allergen Reactions    Venom-wasp     Ortho evra [norelgestromin-ethin.estradiol] Other (See Comments)     Chest pains    Eggs [egg derived] Diarrhea     And nausea     Current Outpatient Medications on File Prior to Visit   Medication Sig Dispense Refill    amoxicillin-clavulanate 875-125mg (AUGMENTIN) 875-125 mg per tablet Take 1 tablet by mouth 2 (two) times daily.  0    butalbital-acetaminophen-caffeine -40 mg (FIORICET, ESGIC) -40 mg per tablet Take 1 tablet by mouth every 6 (six) hours as needed for Headaches.       celecoxib (CELEBREX) 100 MG capsule Take 100 mg by mouth once daily.      EPINEPHrine (EPIPEN 2-JOSE) 0.3 mg/0.3 mL AtIn Inject 0.3 mg into the muscle.      fluticasone (FLONASE) 50 mcg/actuation nasal spray 1 spray in each nostril      ipratropium (ATROVENT) 0.02 % nebulizer solution Take 2.5 mLs (500 mcg total) by nebulization 4 (four) times daily. Rescue 1 Box 6    montelukast (SINGULAIR) 10 mg tablet Take 1 tablet (10 mg  total) by mouth every evening. 90 tablet 4    sertraline (ZOLOFT) 100 MG tablet Take 1 tablet (100 mg total) by mouth once daily. 90 tablet 3    doxycycline (VIBRAMYCIN) 100 MG Cap Take 1 capsule (100 mg total) by mouth 2 (two) times daily. 20 capsule 0    fluticasone furoate (ARNUITY ELLIPTA) 200 mcg/actuation DsDv 1 puff      hydrOXYzine HCl (ATARAX) 25 MG tablet Take 1 tablet (25 mg total) by mouth 3 (three) times daily as needed for Anxiety. (Patient not taking: Reported on 10/22/2019) 30 tablet 1    methylPREDNISolone (MEDROL DOSEPACK) 4 mg tablet use as directed 1 Package 0    promethazine-dextromethorphan (PROMETHAZINE-DM) 6.25-15 mg/5 mL Syrp promethazine-DM 6.25 mg-15 mg/5 mL oral syrup   TAKE 5 MILLILITERS (ONE TEASPOONFUL) BY MOUTH EVERY FOUR TO SIX HOURS AS NEEDED      sumatriptan (IMITREX) 50 MG tablet Take 50 mg by mouth every 2 (two) hours as needed for Migraine (don't exceed 200mg in a 24 hr period).      tiZANidine (ZANAFLEX) 4 MG tablet       topiramate (TOPAMAX) 25 MG tablet Take 25 mg by mouth 2 (two) times daily. If symptoms not improved in 48 hours may increase dose to 50 mg twice a day       No current facility-administered medications on file prior to visit.      Social History     Socioeconomic History    Marital status:      Spouse name: Not on file    Number of children: Not on file    Years of education: Not on file    Highest education level: Not on file   Occupational History    Not on file   Social Needs    Financial resource strain: Not on file    Food insecurity:     Worry: Not on file     Inability: Not on file    Transportation needs:     Medical: Not on file     Non-medical: Not on file   Tobacco Use    Smoking status: Never Smoker    Smokeless tobacco: Never Used   Substance and Sexual Activity    Alcohol use: No    Drug use: No    Sexual activity: Yes   Lifestyle    Physical activity:     Days per week: Not on file     Minutes per session: Not on  file    Stress: Not on file   Relationships    Social connections:     Talks on phone: Not on file     Gets together: Not on file     Attends [employer] service: Not on file     Active member of club or organization: Not on file     Attends meetings of clubs or organizations: Not on file     Relationship status: Not on file   Other Topics Concern    Not on file   Social History Narrative    Not on file     Family History   Problem Relation Age of Onset    Hypertension Mother     Heart disease Mother     Hyperlipidemia Mother     Arthritis Mother     Depression Father     Suicide Father 32    No Known Problems Sister     No Known Problems Brother     No Known Problems Brother          ROS:GENERAL: No fever, chills, fatigability or weight loss.  SKIN: No rashes, itching or changes in color or texture of skin.  HEAD: No headaches or recent head trauma.EYES: Visual acuity fine. No photophobia, ocular pain or diplopia.EARS: Denies ear pain, discharge or vertigo.NOSE: No loss of smell, no epistaxis or postnasal drip.MOUTH & THROAT: No hoarseness or change in voice. No excessive gum bleeding.NODES: Denies swollen glands.  CHEST: Denies JACOBS, cyanosis, wheezing, cough and sputum production.  CARDIOVASCULAR: Denies chest pain, PND, orthopnea or reduced exercise tolerance.  ABDOMEN: Appetite fine. No weight loss. Denies diarrhea, abdominal pain, hematemesis or blood in stool.  URINARY: No flank pain, dysuria or hematuria.  PERIPHERAL VASCULAR: No claudication or cyanosis.  MUSCULOSKELETAL: See above.  NEUROLOGIC: No history of seizures, paralysis, alteration of gait or coordination.  PE:    HEAD: Normocephalic, atraumatic.EYES: PERRL. EOMI.   EARS: TM's intact. Light reflex normal. No retraction or perforation.   NOSE: Mucosa pink. Airway clear.MOUTH & THROAT: No tonsillar enlargement. No pharyngeal erythema or exudate. No stridor.  NODES: No cervical, axillary or inguinal lymph node enlargement.  CHEST: Lungs  clear to auscultation.  CARDIOVASCULAR: Normal S1, S2. No rubs, murmurs or gallops.  ABDOMEN: Bowel sounds normal. Not distended. Soft. No tenderness or masses.  MUSCULOSKELETAL: No palpable abnormality  NEUROLOGIC: Cranial Nerves: II-XII grossly intact.  Motor: 5/5 strength major flexors/extensors.  DTR's: Knees, Ankles 2+ and equal bilaterally; downgoing toes.  Sensory: Intact to light touch distally.  Gait & Posture: Normal gait and fine motion. No cerebellar signs.     Impression:Complx HA  HyperTG  Plan:60 min OV >50% re causes and tx HA, treatment options  Lab eval and imaging reviewed  Rec diet and ex recs   Neuro con fu

## 2019-10-24 ENCOUNTER — TELEPHONE (OUTPATIENT)
Dept: NEUROLOGY | Facility: CLINIC | Age: 34
End: 2019-10-24

## 2019-10-24 NOTE — TELEPHONE ENCOUNTER
Called patient and scheduled appointment. Added patient to wait list. Patient expressed understanding.

## 2019-10-24 NOTE — TELEPHONE ENCOUNTER
----- Message from Marley Chao MA sent at 10/24/2019  9:40 AM CDT -----  Dr. Lemos is asking for the patient to be booked for complex migraine, it is not urgent but would like to know if it can be done within the next couple months.    Thanks,

## 2019-10-28 RX ORDER — TIZANIDINE 4 MG/1
4 TABLET ORAL EVERY 8 HOURS
Qty: 60 TABLET | Refills: 3 | Status: SHIPPED | OUTPATIENT
Start: 2019-10-28 | End: 2020-03-31 | Stop reason: SDUPTHER

## 2019-10-28 NOTE — TELEPHONE ENCOUNTER
----- Message from Cecilio Alvarez sent at 10/28/2019  3:24 PM CDT -----  Contact: Pt  Type:  RX Refill Request    Who Called: Ludy Carney  Refill or New Rx:Refill  RX Name and Strength:tiZANidine (ZANAFLEX) 4 MG tablet  How is the patient currently taking it? (ex. 1XDay): As needed  Is this a 30 day or 90 day RX:   Preferred Pharmacy with phone number:  [facility]  [place] - [address] [address]  Phone: [phone] Fax: [phone]  Local or Mail Order:[facility]  Ordering Provider:Dr. Lemos  Would the patient rather a call back or a response via [facility]? Call Back  Best Call Back Number:[phone] (home)   Additional Information:

## 2019-10-31 ENCOUNTER — PATIENT OUTREACH (OUTPATIENT)
Dept: ADMINISTRATIVE | Facility: HOSPITAL | Age: 34
End: 2019-10-31

## 2019-10-31 NOTE — PROGRESS NOTES
Health Maintenance reviewed. External lipid dated 10/09/19 performed at [facility].

## 2019-12-11 ENCOUNTER — TELEPHONE (OUTPATIENT)
Dept: FAMILY MEDICINE | Facility: CLINIC | Age: 34
End: 2019-12-11
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
## 2019-12-12 ENCOUNTER — OFFICE VISIT (OUTPATIENT)
Dept: FAMILY MEDICINE | Facility: CLINIC | Age: 34
End: 2019-12-12
Payer: COMMERCIAL

## 2019-12-12 VITALS
TEMPERATURE: 99 F | HEART RATE: 78 BPM | HEIGHT: 64 IN | SYSTOLIC BLOOD PRESSURE: 120 MMHG | BODY MASS INDEX: 26.06 KG/M2 | DIASTOLIC BLOOD PRESSURE: 77 MMHG | WEIGHT: 152.63 LBS

## 2019-12-12 DIAGNOSIS — S39.012D STRAIN OF LUMBAR REGION, SUBSEQUENT ENCOUNTER: ICD-10-CM

## 2019-12-12 DIAGNOSIS — J02.9 PHARYNGITIS, UNSPECIFIED ETIOLOGY: ICD-10-CM

## 2019-12-12 DIAGNOSIS — S16.1XXD STRAIN OF NECK MUSCLE, SUBSEQUENT ENCOUNTER: Primary | ICD-10-CM

## 2019-12-12 PROCEDURE — 99214 PR OFFICE/OUTPT VISIT, EST, LEVL IV, 30-39 MIN: ICD-10-PCS | Mod: 25,S$GLB,, | Performed by: FAMILY MEDICINE

## 2019-12-12 PROCEDURE — 96372 THER/PROPH/DIAG INJ SC/IM: CPT | Mod: S$GLB,,, | Performed by: FAMILY MEDICINE

## 2019-12-12 PROCEDURE — 96372 PR INJECTION,THERAP/PROPH/DIAG2ST, IM OR SUBCUT: ICD-10-PCS | Mod: S$GLB,,, | Performed by: FAMILY MEDICINE

## 2019-12-12 PROCEDURE — 99999 PR PBB SHADOW E&M-EST. PATIENT-LVL IV: ICD-10-PCS | Mod: PBBFAC,,, | Performed by: FAMILY MEDICINE

## 2019-12-12 PROCEDURE — 99214 OFFICE O/P EST MOD 30 MIN: CPT | Mod: 25,S$GLB,, | Performed by: FAMILY MEDICINE

## 2019-12-12 PROCEDURE — 99999 PR PBB SHADOW E&M-EST. PATIENT-LVL IV: CPT | Mod: PBBFAC,,, | Performed by: FAMILY MEDICINE

## 2019-12-12 RX ORDER — GALCANEZUMAB 120 MG/ML
INJECTION, SOLUTION SUBCUTANEOUS
Refills: 6 | COMMUNITY
Start: 2019-11-15 | End: 2022-07-12

## 2019-12-12 RX ORDER — KETOROLAC TROMETHAMINE 30 MG/ML
60 INJECTION, SOLUTION INTRAMUSCULAR; INTRAVENOUS
Status: COMPLETED | OUTPATIENT
Start: 2019-12-12 | End: 2019-12-12

## 2019-12-12 RX ORDER — HYDROCODONE BITARTRATE AND ACETAMINOPHEN 10; 325 MG/1; MG/1
1 TABLET ORAL EVERY 6 HOURS PRN
Qty: 20 TABLET | Refills: 0 | Status: SHIPPED | OUTPATIENT
Start: 2019-12-12 | End: 2020-03-12

## 2019-12-12 RX ORDER — DICLOFENAC SODIUM 75 MG/1
75 TABLET, DELAYED RELEASE ORAL 2 TIMES DAILY PRN
Refills: 0 | COMMUNITY
Start: 2019-12-10 | End: 2019-12-12

## 2019-12-12 RX ORDER — DEXAMETHASONE SODIUM PHOSPHATE 4 MG/ML
8 INJECTION, SOLUTION INTRA-ARTICULAR; INTRALESIONAL; INTRAMUSCULAR; INTRAVENOUS; SOFT TISSUE ONCE
Status: COMPLETED | OUTPATIENT
Start: 2019-12-12 | End: 2019-12-12

## 2019-12-12 RX ADMIN — KETOROLAC TROMETHAMINE 60 MG: 30 INJECTION, SOLUTION INTRAMUSCULAR; INTRAVENOUS at 10:12

## 2019-12-12 RX ADMIN — DEXAMETHASONE SODIUM PHOSPHATE 8 MG: 4 INJECTION, SOLUTION INTRA-ARTICULAR; INTRALESIONAL; INTRAMUSCULAR; INTRAVENOUS; SOFT TISSUE at 10:12
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
## 2019-12-14 ENCOUNTER — NURSE TRIAGE (OUTPATIENT)
Dept: ADMINISTRATIVE | Facility: CLINIC | Age: 34
End: 2019-12-14

## 2019-12-14 RX ORDER — AMOXICILLIN AND CLAVULANATE POTASSIUM 875; 125 MG/1; MG/1
1 TABLET, FILM COATED ORAL 2 TIMES DAILY
Qty: 20 TABLET | Refills: 0 | Status: SHIPPED | OUTPATIENT
Start: 2019-12-14 | End: 2019-12-24

## 2019-12-14 NOTE — TELEPHONE ENCOUNTER
"Saw dr. Lemos Thursday glands swollen, throat red (diagnosed as virus)  Today Temp. 102.0  Daughter diagnosed with strept yesterday  7/10 pain  md on call ordered augmentin 875 20 1 bid    Reason for Disposition   [1] Exposure to family member (or spouse or boyfriend/girlfriend) with test-proven strep AND [2] within last 10 days    Additional Information   Negative: Severe difficulty breathing (e.g., struggling for each breath, speaks in single words, stridor)   Negative: Sounds like a life-threatening emergency to the triager   Negative: [1] Diagnosed strep throat AND [2] taking antibiotic AND [3] symptoms continue   Negative: Throat culture results, call about   Negative: Productive cough is main symptom   Negative: Non-productive cough is main symptom   Negative: Hoarseness is main symptom   Negative: Runny nose is main symptom   Negative: [1] Drooling or spitting out saliva (because can't swallow) AND [2] normal breathing   Negative: Unable to open mouth completely   Negative: [1] Difficulty breathing AND [2] not severe   Negative: Fever > 104 F (40 C)   Negative: [1] Refuses to drink anything AND [2] for > 12 hours   Negative: [1] Drinking very little AND [2] dehydration suspected (e.g., no urine > 12 hours, very dry mouth, very lightheaded)   Negative: Patient sounds very sick or weak to the triager   Negative: SEVERE (e.g., excruciating) throat pain   Negative: [1] Pus on tonsils (back of throat) AND [2]  fever AND [3] swollen neck lymph nodes ("glands")   Negative: [1] Rash AND [2] widespread (especially chest and abdomen)   Negative: Earache also present   Negative: Fever present > 3 days (72 hours)   Negative: Diabetes mellitus or weak immune system (e.g., HIV positive, cancer chemo, splenectomy, organ transplant)   Negative: History of rheumatic fever   Negative: [1] Adult is leaving on a trip AND [2] requests an antibiotic NOW   Negative: [1] Positive throat culture or rapid strep " test (according to lab, PCP, caller, etc.) AND [2] NO  standing order to call in prescription for antibiotic    Protocols used: SORE THROAT-A-AH

## 2020-01-02 RX ORDER — SERTRALINE HYDROCHLORIDE 100 MG/1
TABLET, FILM COATED ORAL
Qty: 90 TABLET | Refills: 3 | Status: SHIPPED | OUTPATIENT
Start: 2020-01-02 | End: 2020-11-13

## 2020-03-02 ENCOUNTER — TELEPHONE (OUTPATIENT)
Dept: FAMILY MEDICINE | Facility: CLINIC | Age: 35
End: 2020-03-02

## 2020-03-02 NOTE — TELEPHONE ENCOUNTER
Returned phone call to patient. Encouraged her to establish care with new PCP. Pt verbalized understanding. Will call back to make appointment.

## 2020-03-02 NOTE — TELEPHONE ENCOUNTER
----- Message from Kelli Gibson sent at 3/2/2020 11:05 AM CST -----  Contact: Pt  Pt is a previous pt of Dr. Lemos and would like to know if she would need to be seen in office in reference to MRI/CT SCAN that Dr. Lemos ordered.          Pls call back at [phone]

## 2020-03-03 ENCOUNTER — TELEPHONE (OUTPATIENT)
Dept: FAMILY MEDICINE | Facility: CLINIC | Age: 35
End: 2020-03-03

## 2020-03-03 NOTE — TELEPHONE ENCOUNTER
----- Message from Susan Rollins sent at 3/3/2020  3:39 PM CST -----  Contact: PT   The patient is calling in regards to getting a referral for a MRI due to having that neck pain but currently has a an in her back and is not sure if she could go forward with the test,please advise as soon as possible, Ph#[phone]

## 2020-03-12 ENCOUNTER — OFFICE VISIT (OUTPATIENT)
Dept: FAMILY MEDICINE | Facility: CLINIC | Age: 35
End: 2020-03-12
Payer: COMMERCIAL

## 2020-03-12 ENCOUNTER — LAB VISIT (OUTPATIENT)
Dept: LAB | Facility: HOSPITAL | Age: 35
End: 2020-03-12
Attending: INTERNAL MEDICINE
Payer: COMMERCIAL

## 2020-03-12 VITALS
TEMPERATURE: 99 F | HEIGHT: 64 IN | HEART RATE: 77 BPM | SYSTOLIC BLOOD PRESSURE: 117 MMHG | WEIGHT: 157 LBS | DIASTOLIC BLOOD PRESSURE: 69 MMHG | BODY MASS INDEX: 26.8 KG/M2

## 2020-03-12 DIAGNOSIS — F41.9 ANXIETY AND DEPRESSION: ICD-10-CM

## 2020-03-12 DIAGNOSIS — F32.A ANXIETY AND DEPRESSION: ICD-10-CM

## 2020-03-12 DIAGNOSIS — D80.8 IGE DEFICIENCY: Primary | ICD-10-CM

## 2020-03-12 DIAGNOSIS — M50.30 DDD (DEGENERATIVE DISC DISEASE), CERVICAL: ICD-10-CM

## 2020-03-12 DIAGNOSIS — G43.409 HEMIPLEGIC MIGRAINE WITHOUT STATUS MIGRAINOSUS, NOT INTRACTABLE: ICD-10-CM

## 2020-03-12 DIAGNOSIS — Z00.00 ENCOUNTER FOR ANNUAL HEALTH EXAMINATION: ICD-10-CM

## 2020-03-12 DIAGNOSIS — M51.36 DDD (DEGENERATIVE DISC DISEASE), LUMBAR: ICD-10-CM

## 2020-03-12 PROBLEM — G43.909 MIGRAINES: Status: ACTIVE | Noted: 2020-03-12

## 2020-03-12 LAB
ALBUMIN SERPL BCP-MCNC: 4.3 G/DL (ref 3.5–5.2)
ALP SERPL-CCNC: 68 U/L (ref 55–135)
ALT SERPL W/O P-5'-P-CCNC: 19 U/L (ref 10–44)
ANION GAP SERPL CALC-SCNC: 6 MMOL/L (ref 8–16)
AST SERPL-CCNC: 17 U/L (ref 10–40)
BASOPHILS # BLD AUTO: 0.07 K/UL (ref 0–0.2)
BASOPHILS NFR BLD: 1 % (ref 0–1.9)
BILIRUB SERPL-MCNC: 0.5 MG/DL (ref 0.1–1)
BUN SERPL-MCNC: 12 MG/DL (ref 6–20)
CALCIUM SERPL-MCNC: 9.2 MG/DL (ref 8.7–10.5)
CHLORIDE SERPL-SCNC: 103 MMOL/L (ref 95–110)
CO2 SERPL-SCNC: 27 MMOL/L (ref 23–29)
CREAT SERPL-MCNC: 0.7 MG/DL (ref 0.5–1.4)
DIFFERENTIAL METHOD: NORMAL
EOSINOPHIL # BLD AUTO: 0.1 K/UL (ref 0–0.5)
EOSINOPHIL NFR BLD: 1.1 % (ref 0–8)
ERYTHROCYTE [DISTWIDTH] IN BLOOD BY AUTOMATED COUNT: 12.3 % (ref 11.5–14.5)
EST. GFR  (AFRICAN AMERICAN): >60 ML/MIN/1.73 M^2
EST. GFR  (NON AFRICAN AMERICAN): >60 ML/MIN/1.73 M^2
GLUCOSE SERPL-MCNC: 94 MG/DL (ref 70–110)
HCT VFR BLD AUTO: 43.5 % (ref 37–48.5)
HGB BLD-MCNC: 14 G/DL (ref 12–16)
IMM GRANULOCYTES # BLD AUTO: 0.03 K/UL (ref 0–0.04)
IMM GRANULOCYTES NFR BLD AUTO: 0.4 % (ref 0–0.5)
LYMPHOCYTES # BLD AUTO: 2 K/UL (ref 1–4.8)
LYMPHOCYTES NFR BLD: 27.7 % (ref 18–48)
MCH RBC QN AUTO: 28.1 PG (ref 27–31)
MCHC RBC AUTO-ENTMCNC: 32.2 G/DL (ref 32–36)
MCV RBC AUTO: 87 FL (ref 82–98)
MONOCYTES # BLD AUTO: 0.5 K/UL (ref 0.3–1)
MONOCYTES NFR BLD: 6.2 % (ref 4–15)
NEUTROPHILS # BLD AUTO: 4.7 K/UL (ref 1.8–7.7)
NEUTROPHILS NFR BLD: 63.6 % (ref 38–73)
NRBC BLD-RTO: 0 /100 WBC
PLATELET # BLD AUTO: 234 K/UL (ref 150–350)
PMV BLD AUTO: 10.8 FL (ref 9.2–12.9)
POTASSIUM SERPL-SCNC: 4.1 MMOL/L (ref 3.5–5.1)
PROT SERPL-MCNC: 7.6 G/DL (ref 6–8.4)
RBC # BLD AUTO: 4.98 M/UL (ref 4–5.4)
SODIUM SERPL-SCNC: 136 MMOL/L (ref 136–145)
WBC # BLD AUTO: 7.3 K/UL (ref 3.9–12.7)

## 2020-03-12 PROCEDURE — 80053 COMPREHEN METABOLIC PANEL: CPT

## 2020-03-12 PROCEDURE — 99999 PR PBB SHADOW E&M-EST. PATIENT-LVL IV: CPT | Mod: PBBFAC,,, | Performed by: INTERNAL MEDICINE

## 2020-03-12 PROCEDURE — 99214 PR OFFICE/OUTPT VISIT, EST, LEVL IV, 30-39 MIN: ICD-10-PCS | Mod: S$GLB,,, | Performed by: INTERNAL MEDICINE

## 2020-03-12 PROCEDURE — 99214 OFFICE O/P EST MOD 30 MIN: CPT | Mod: S$GLB,,, | Performed by: INTERNAL MEDICINE

## 2020-03-12 PROCEDURE — 36415 COLL VENOUS BLD VENIPUNCTURE: CPT | Mod: PO

## 2020-03-12 PROCEDURE — 85025 COMPLETE CBC W/AUTO DIFF WBC: CPT

## 2020-03-12 PROCEDURE — 99999 PR PBB SHADOW E&M-EST. PATIENT-LVL IV: ICD-10-PCS | Mod: PBBFAC,,, | Performed by: INTERNAL MEDICINE

## 2020-03-12 RX ORDER — PHENYLPROPANOLAMINE/CLEMASTINE 75-1.34MG
1 TABLET, EXTENDED RELEASE ORAL DAILY PRN
COMMUNITY
End: 2021-04-29

## 2020-03-12 RX ORDER — TRAMADOL HYDROCHLORIDE 50 MG/1
50 TABLET ORAL EVERY 6 HOURS
Qty: 20 TABLET | Refills: 0 | Status: SHIPPED | OUTPATIENT
Start: 2020-03-12 | End: 2020-10-13 | Stop reason: CLARIF

## 2020-03-12 RX ORDER — CELECOXIB 200 MG/1
1 CAPSULE ORAL DAILY
COMMUNITY
End: 2020-10-13 | Stop reason: CLARIF

## 2020-03-12 NOTE — ASSESSMENT & PLAN NOTE
-hx of hemiplegic migraines  -Followed by neurology, Dr. Warern  -Currently on emgality 120 mg monthly with improvement of headaches  -Has PRN fiorect for abortive therapy

## 2020-03-12 NOTE — ASSESSMENT & PLAN NOTE
-Previously followed by psychiatry in the past but no longer is  -Depression symptoms controlled with occasional panic attacks
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
## 2020-03-15 NOTE — PROGRESS NOTES
Results have been released via [employer]. Please verify that these have been viewed by patient. If not, please call patient with results.    I have sent a message to them with the following interpretation (see below).    I have reviewed your recent blood work.     Your complete blood count is within normal limits.    Your metabolic panel which shows your electrolytes, glucose, kidney and liver function is within normal limits.    Please do not hesitate to call or message with any additional questions or concerns.

## 2020-03-31 RX ORDER — TIZANIDINE 4 MG/1
4 TABLET ORAL EVERY 8 HOURS
Qty: 60 TABLET | Refills: 3 | Status: SHIPPED | OUTPATIENT
Start: 2020-03-31 | End: 2022-07-12

## 2020-04-08 ENCOUNTER — OFFICE VISIT (OUTPATIENT)
Dept: FAMILY MEDICINE | Facility: CLINIC | Age: 35
End: 2020-04-08
Payer: COMMERCIAL

## 2020-04-08 DIAGNOSIS — B34.9 VIRAL ILLNESS: Primary | ICD-10-CM

## 2020-04-08 PROCEDURE — 99214 PR OFFICE/OUTPT VISIT, EST, LEVL IV, 30-39 MIN: ICD-10-PCS | Mod: 95,,, | Performed by: INTERNAL MEDICINE

## 2020-04-08 PROCEDURE — U0002 COVID-19 LAB TEST NON-CDC: HCPCS

## 2020-04-08 PROCEDURE — 99214 OFFICE O/P EST MOD 30 MIN: CPT | Mod: 95,,, | Performed by: INTERNAL MEDICINE

## 2020-04-08 NOTE — PROGRESS NOTES
Primary Care Telemedicine Note  The patient location is:  Patient Home   The chief complaint leading to consultation is: Fever  Total time spent with patient: 10 min    Visit type: Virtual visit with synchronous audio only and video  Each patient to whom he or she provides medical services by telemedicine is:  (1) informed of the relationship between the physician and patient and the respective role of any other health care provider with respect to management of the patient; and (2) notified that he or she may decline to receive medical services by telemedicine and may withdraw from such care at any time.      Assessment/Plan:    Viral illness  -Symptoms concerning for COVID-19 with fever, cough, myalgias, anosmia  -COVID-19 testing ordered  -[facility] instructions provided on staying home and other infection prevention while waiting on results  -ER precautions given for development of severe respiratory symptoms  -Refilled patient inhaler    _____________________________________________________________________________________________________________________________________________________    Orders this visit:    Viral illness  -     ipratropium (ATROVENT HFA) 17 mcg/actuation inhaler; Inhale 2 puffs into the lungs every 6 (six) hours. Rescue  Dispense: 1 Inhaler; Refill: 1  -     SARS- CoV-2 (COVID-19) QUALITATIVE PCR      Follow up if symptoms worsen or fail to improve.    Lucy Dubose MD  _____________________________________________________________________________________________________________________________________________________    CC: Fever    HPI:  Patient visit via telemedicine for complaint of fever along with several other acute symptoms. Patient reports symptoms of headaches, dry cough, myalgia and fevers for the past several days. Denies SOB. Reports a recent change in taste and smell. She is not working and has been staying home due to COVID-19, however her  has continued to work and is  contact with the public. She is on flonase and antihistamines chronically. Not taking anything else for symptoms. Patient does have a hx of immunodeficiency.    Past Medical History:  Past Medical History:   Diagnosis Date    Anxiety     Aortitis 2016    Arteritis         DDD (degenerative disc disease), cervical     DDD (degenerative disc disease), lumbar     Endometriosis     Heart palpitations     History of lumbar spinal fusion     Pain, pelvic, female     PTSD (post-traumatic stress disorder)      Past Surgical History:   Procedure Laterality Date    APPENDECTOMY       SECTION, CLASSIC      x 3    DILATION AND CURETTAGE OF UTERUS      x 2    HYSTERECTOMY  10/15/2016    LUMBAR EPIDURAL INJECTION      multiple times over last eight yrs    LUMBAR FUSION  12/31/15    PELVIC LAPAROSCOPY      for endometriosis    RHIZOTOMY W/ RADIOFREQUENCY ABLATION      several ( back and neck ) over past 8 yrs    tonsillectomy      TONSILLECTOMY       Review of patient's allergies indicates:   Allergen Reactions    Imitrex [sumatriptan] Hallucinations    Venom-wasp     Bee venom protein (honey bee)     Ortho evra [norelgestromin-ethin.estradiol] Other (See Comments)     Chest pains    Eggs [egg derived] Diarrhea     And nausea     Social History     Tobacco Use    Smoking status: Never Smoker    Smokeless tobacco: Never Used   Substance Use Topics    Alcohol use: No    Drug use: No     Family History   Problem Relation Age of Onset    Hypertension Mother     Heart disease Mother     Hyperlipidemia Mother     Arthritis Mother     Depression Father     Suicide Father 32    No Known Problems Sister     No Known Problems Brother     No Known Problems Brother      Current Outpatient Medications on File Prior to Visit   Medication Sig Dispense Refill    butalbital-acetaminophen-caffeine -40 mg (FIORICET, ESGIC) -40 mg per tablet Take 1 tablet by mouth every 6 (six) hours  as needed for Headaches.       celecoxib (CELEBREX) 200 MG capsule Take 1 capsule by mouth once daily.      EMGALITY  mg/mL PnIj Inject 120 mg into the skin every 28 days  6    EPINEPHrine (EPIPEN 2-JOSE) 0.3 mg/0.3 mL AtIn Inject 0.3 mg into the muscle.      fluticasone (FLONASE) 50 mcg/actuation nasal spray 1 spray in each nostril      ibuprofen 200 mg Cap Take 1 capsule by mouth once daily.      montelukast (SINGULAIR) 10 mg tablet Take 1 tablet (10 mg total) by mouth every evening. 90 tablet 4    sertraline (ZOLOFT) 100 MG tablet TAKE 1 TABLET BY MOUTH ONCE DAILY 90 tablet 3    tiZANidine (ZANAFLEX) 4 MG tablet Take 1 tablet (4 mg total) by mouth every 8 (eight) hours. 60 tablet 3    traMADoL (ULTRAM) 50 mg tablet Take 1 tablet (50 mg total) by mouth every 6 (six) hours. 20 tablet 0     No current facility-administered medications on file prior to visit.        Review of Systems   Constitutional: Positive for chills, fever and malaise/fatigue.   HENT: Positive for congestion.    Respiratory: Positive for cough. Negative for shortness of breath.    Cardiovascular: Negative for chest pain, palpitations and leg swelling.   Gastrointestinal: Negative for abdominal pain, constipation and diarrhea.   Genitourinary: Negative for dysuria and frequency.   Musculoskeletal: Negative for back pain and joint pain.   Neurological: Negative for headaches.         Physical Exam   No flowsheet data found.  No flowsheet data found.      No flowsheet data found.    Physical Exam   Constitutional: She is oriented to person, place, and time. She appears well-developed and well-nourished. No distress.   HENT:   Head: Normocephalic and atraumatic.   Eyes: EOM are normal.   Neck: Normal range of motion.   Pulmonary/Chest: Effort normal. No respiratory distress.   Neurological: She is alert and oriented to person, place, and time.   Psychiatric: She has a normal mood and affect. Her behavior is normal.       The patient's  Health Maintenance was reviewed and the following appears to be due at this time:  There are no preventive care reminders to display for this patient.

## 2020-04-08 NOTE — ASSESSMENT & PLAN NOTE
-Symptoms concerning for COVID-19 with fever, cough, myalgias, anosmia  -COVID-19 testing ordered  -CDC instructions provided on staying home and other infection prevention while waiting on results  -ER precautions given for development of severe respiratory symptoms  -Refilled patient inhaler

## 2020-04-09 LAB — SARS-COV-2 RNA RESP QL NAA+PROBE: NOT DETECTED

## 2020-04-09 NOTE — PROGRESS NOTES
Results have been released via [facility]. Please verify that these have been viewed by patient. If not, please call patient with results.    I have sent a message to them with the following interpretation (see below).    Your test was NEGATIVE for COVID-19 (coronavirus).  If you still have symptoms, treat with rest, fluids, and over-the-counter medications.  Continue to stay home and practice proper handwashing.     If your symptoms worsen or if you have any other concerns, please contact [facility] at [phone].

## 2020-10-12 ENCOUNTER — CLINICAL SUPPORT (OUTPATIENT)
Dept: FAMILY MEDICINE | Facility: CLINIC | Age: 35
End: 2020-10-12
Payer: COMMERCIAL

## 2020-10-12 DIAGNOSIS — Z01.818 PREOP EXAMINATION: ICD-10-CM

## 2020-10-12 PROCEDURE — U0003 INFECTIOUS AGENT DETECTION BY NUCLEIC ACID (DNA OR RNA); SEVERE ACUTE RESPIRATORY SYNDROME CORONAVIRUS 2 (SARS-COV-2) (CORONAVIRUS DISEASE [COVID-19]), AMPLIFIED PROBE TECHNIQUE, MAKING USE OF HIGH THROUGHPUT TECHNOLOGIES AS DESCRIBED BY CMS-2020-01-R: HCPCS

## 2020-10-13 LAB — SARS-COV-2 RNA RESP QL NAA+PROBE: NOT DETECTED

## 2020-10-15 PROBLEM — Z01.818 PRE-OP TESTING: Status: ACTIVE | Noted: 2020-10-15

## 2020-10-15 PROBLEM — N80.30 ENDOMETRIOSIS OF PELVIC PERITONEUM: Status: ACTIVE | Noted: 2020-10-15

## 2020-10-23 ENCOUNTER — TELEPHONE (OUTPATIENT)
Dept: FAMILY MEDICINE | Facility: CLINIC | Age: 35
End: 2020-10-23

## 2020-10-23 NOTE — TELEPHONE ENCOUNTER
"Called and spoke with patient, stated that she has a low grade fever and is not feeling well. 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 Last sx the immune "thing" attacked her aorta. I advised patient to be assessed Saturday by Dr. Sparrow or call her sx office. Stated sx office is closed so she may come to Saturday clinic to be assessed. I also advise patient to call sx office on Monday to make them aware of immune issue. Patient verbalized understanding of this.   "

## 2020-10-23 NOTE — TELEPHONE ENCOUNTER
----- Message from Mona Thao sent at 10/23/2020  4:11 PM CDT -----  Regarding: post op/sick  Good evening,      Pt is running a low grade temp and would like a call back due to her feeling very sick.    Pt number is [phone]      Thanks,  Mona Thao

## 2021-04-29 ENCOUNTER — PATIENT MESSAGE (OUTPATIENT)
Dept: FAMILY MEDICINE | Facility: CLINIC | Age: 36
End: 2021-04-29

## 2021-04-29 ENCOUNTER — OFFICE VISIT (OUTPATIENT)
Dept: FAMILY MEDICINE | Facility: CLINIC | Age: 36
End: 2021-04-29
Payer: COMMERCIAL

## 2021-04-29 ENCOUNTER — HOSPITAL ENCOUNTER (OUTPATIENT)
Dept: RADIOLOGY | Facility: HOSPITAL | Age: 36
Discharge: HOME | End: 2021-04-29
Attending: INTERNAL MEDICINE
Payer: COMMERCIAL

## 2021-04-29 VITALS
SYSTOLIC BLOOD PRESSURE: 99 MMHG | HEIGHT: 64 IN | DIASTOLIC BLOOD PRESSURE: 64 MMHG | TEMPERATURE: 98 F | HEART RATE: 73 BPM | WEIGHT: 136 LBS | BODY MASS INDEX: 23.22 KG/M2

## 2021-04-29 DIAGNOSIS — M25.562 CHRONIC PAIN OF BOTH KNEES: ICD-10-CM

## 2021-04-29 DIAGNOSIS — F41.9 ANXIETY AND DEPRESSION: ICD-10-CM

## 2021-04-29 DIAGNOSIS — Z13.6 ENCOUNTER FOR LIPID SCREENING FOR CARDIOVASCULAR DISEASE: ICD-10-CM

## 2021-04-29 DIAGNOSIS — R53.82 CHRONIC FATIGUE: ICD-10-CM

## 2021-04-29 DIAGNOSIS — G89.29 CHRONIC PAIN OF BOTH KNEES: ICD-10-CM

## 2021-04-29 DIAGNOSIS — Z13.220 ENCOUNTER FOR LIPID SCREENING FOR CARDIOVASCULAR DISEASE: ICD-10-CM

## 2021-04-29 DIAGNOSIS — M25.561 CHRONIC PAIN OF BOTH KNEES: ICD-10-CM

## 2021-04-29 DIAGNOSIS — F32.A ANXIETY AND DEPRESSION: ICD-10-CM

## 2021-04-29 DIAGNOSIS — M25.50 ARTHRALGIA, UNSPECIFIED JOINT: Primary | ICD-10-CM

## 2021-04-29 DIAGNOSIS — Z11.59 ENCOUNTER FOR HEPATITIS C SCREENING TEST FOR LOW RISK PATIENT: ICD-10-CM

## 2021-04-29 PROCEDURE — 1126F AMNT PAIN NOTED NONE PRSNT: CPT | Mod: S$GLB,,, | Performed by: INTERNAL MEDICINE

## 2021-04-29 PROCEDURE — 73562 X-RAY EXAM OF KNEE 3: CPT | Mod: TC,50,PO

## 2021-04-29 PROCEDURE — 1126F PR PAIN SEVERITY QUANTIFIED, NO PAIN PRESENT: ICD-10-PCS | Mod: S$GLB,,, | Performed by: INTERNAL MEDICINE

## 2021-04-29 PROCEDURE — 3008F BODY MASS INDEX DOCD: CPT | Mod: CPTII,S$GLB,, | Performed by: INTERNAL MEDICINE

## 2021-04-29 PROCEDURE — 73562 X-RAY EXAM OF KNEE 3: CPT | Mod: 26,,, | Performed by: RADIOLOGY

## 2021-04-29 PROCEDURE — 99214 PR OFFICE/OUTPT VISIT, EST, LEVL IV, 30-39 MIN: ICD-10-PCS | Mod: S$GLB,,, | Performed by: INTERNAL MEDICINE

## 2021-04-29 PROCEDURE — 3008F PR BODY MASS INDEX (BMI) DOCUMENTED: ICD-10-PCS | Mod: CPTII,S$GLB,, | Performed by: INTERNAL MEDICINE

## 2021-04-29 PROCEDURE — 99214 OFFICE O/P EST MOD 30 MIN: CPT | Mod: S$GLB,,, | Performed by: INTERNAL MEDICINE

## 2021-04-29 PROCEDURE — 99999 PR PBB SHADOW E&M-EST. PATIENT-LVL IV: CPT | Mod: PBBFAC,,, | Performed by: INTERNAL MEDICINE

## 2021-04-29 PROCEDURE — 99999 PR PBB SHADOW E&M-EST. PATIENT-LVL IV: ICD-10-PCS | Mod: PBBFAC,,, | Performed by: INTERNAL MEDICINE

## 2021-04-29 PROCEDURE — 73562 XR KNEE ORTHO BILAT: ICD-10-PCS | Mod: 26,,, | Performed by: RADIOLOGY

## 2021-04-29 RX ORDER — ESTRADIOL 2 MG/1
2 TABLET ORAL DAILY
COMMUNITY

## 2021-04-29 RX ORDER — VENLAFAXINE HYDROCHLORIDE 150 MG/1
150 CAPSULE, EXTENDED RELEASE ORAL DAILY
Qty: 30 CAPSULE | Refills: 11 | Status: SHIPPED | OUTPATIENT
Start: 2021-04-29 | End: 2021-10-12 | Stop reason: SDUPTHER

## 2021-05-05 ENCOUNTER — PATIENT MESSAGE (OUTPATIENT)
Dept: ORTHOPEDICS | Facility: CLINIC | Age: 36
End: 2021-05-05

## 2021-05-05 ENCOUNTER — OFFICE VISIT (OUTPATIENT)
Dept: ORTHOPEDICS | Facility: CLINIC | Age: 36
End: 2021-05-05
Payer: COMMERCIAL

## 2021-05-05 VITALS — HEIGHT: 64 IN | BODY MASS INDEX: 23.22 KG/M2 | WEIGHT: 136 LBS

## 2021-05-05 DIAGNOSIS — M25.562 CHRONIC PAIN OF BOTH KNEES: ICD-10-CM

## 2021-05-05 DIAGNOSIS — M25.562 CHRONIC PAIN OF LEFT KNEE: ICD-10-CM

## 2021-05-05 DIAGNOSIS — M25.561 CHRONIC PAIN OF BOTH KNEES: ICD-10-CM

## 2021-05-05 DIAGNOSIS — G89.29 CHRONIC PAIN OF LEFT KNEE: ICD-10-CM

## 2021-05-05 DIAGNOSIS — G89.29 CHRONIC PAIN OF BOTH KNEES: ICD-10-CM

## 2021-05-05 PROCEDURE — 1125F PR PAIN SEVERITY QUANTIFIED, PAIN PRESENT: ICD-10-PCS | Mod: S$GLB,,, | Performed by: ORTHOPAEDIC SURGERY

## 2021-05-05 PROCEDURE — 1125F AMNT PAIN NOTED PAIN PRSNT: CPT | Mod: S$GLB,,, | Performed by: ORTHOPAEDIC SURGERY

## 2021-05-05 PROCEDURE — 99203 OFFICE O/P NEW LOW 30 MIN: CPT | Mod: S$GLB,,, | Performed by: ORTHOPAEDIC SURGERY

## 2021-05-05 PROCEDURE — 99203 PR OFFICE/OUTPT VISIT, NEW, LEVL III, 30-44 MIN: ICD-10-PCS | Mod: S$GLB,,, | Performed by: ORTHOPAEDIC SURGERY

## 2021-05-05 PROCEDURE — 3008F PR BODY MASS INDEX (BMI) DOCUMENTED: ICD-10-PCS | Mod: CPTII,S$GLB,, | Performed by: ORTHOPAEDIC SURGERY

## 2021-05-05 PROCEDURE — 99999 PR PBB SHADOW E&M-EST. PATIENT-LVL III: CPT | Mod: PBBFAC,,, | Performed by: ORTHOPAEDIC SURGERY

## 2021-05-05 PROCEDURE — 99999 PR PBB SHADOW E&M-EST. PATIENT-LVL III: ICD-10-PCS | Mod: PBBFAC,,, | Performed by: ORTHOPAEDIC SURGERY

## 2021-05-05 PROCEDURE — 3008F BODY MASS INDEX DOCD: CPT | Mod: CPTII,S$GLB,, | Performed by: ORTHOPAEDIC SURGERY

## 2021-05-06 ENCOUNTER — PATIENT MESSAGE (OUTPATIENT)
Dept: RESEARCH | Facility: HOSPITAL | Age: 36
End: 2021-05-06

## 2021-05-06 ENCOUNTER — PATIENT MESSAGE (OUTPATIENT)
Dept: FAMILY MEDICINE | Facility: CLINIC | Age: 36
End: 2021-05-06

## 2021-05-06 ENCOUNTER — TELEPHONE (OUTPATIENT)
Dept: FAMILY MEDICINE | Facility: CLINIC | Age: 36
End: 2021-05-06

## 2021-05-06 DIAGNOSIS — R53.82 CHRONIC FATIGUE: Primary | ICD-10-CM

## 2021-05-07 ENCOUNTER — PATIENT MESSAGE (OUTPATIENT)
Dept: FAMILY MEDICINE | Facility: CLINIC | Age: 36
End: 2021-05-07

## 2021-05-07 DIAGNOSIS — R06.83 SNORING: ICD-10-CM

## 2021-05-07 DIAGNOSIS — R40.0 DAYTIME SOMNOLENCE: Primary | ICD-10-CM

## 2021-05-10 ENCOUNTER — PATIENT MESSAGE (OUTPATIENT)
Dept: FAMILY MEDICINE | Facility: CLINIC | Age: 36
End: 2021-05-10

## 2021-05-16 ENCOUNTER — TELEPHONE (OUTPATIENT)
Dept: PULMONOLOGY | Facility: HOSPITAL | Age: 36
End: 2021-05-16

## 2021-05-18 ENCOUNTER — TELEPHONE (OUTPATIENT)
Dept: ORTHOPEDICS | Facility: CLINIC | Age: 36
End: 2021-05-18

## 2021-05-20 ENCOUNTER — TELEPHONE (OUTPATIENT)
Dept: ORTHOPEDICS | Facility: CLINIC | Age: 36
End: 2021-05-20

## 2021-05-28 ENCOUNTER — PROCEDURE VISIT (OUTPATIENT)
Dept: SLEEP MEDICINE | Facility: CLINIC | Age: 36
End: 2021-05-28
Payer: COMMERCIAL

## 2021-05-28 DIAGNOSIS — R40.0 DAYTIME SOMNOLENCE: ICD-10-CM

## 2021-05-28 DIAGNOSIS — R06.83 PRIMARY SNORING: Primary | ICD-10-CM

## 2021-05-28 DIAGNOSIS — R06.83 SNORING: ICD-10-CM

## 2021-06-03 ENCOUNTER — PATIENT MESSAGE (OUTPATIENT)
Dept: ORTHOPEDICS | Facility: CLINIC | Age: 36
End: 2021-06-03

## 2021-06-04 DIAGNOSIS — M25.562 CHRONIC PAIN OF LEFT KNEE: Primary | ICD-10-CM

## 2021-06-04 DIAGNOSIS — G89.29 CHRONIC PAIN OF LEFT KNEE: Primary | ICD-10-CM

## 2021-06-04 DIAGNOSIS — M25.569 KNEE PAIN, UNSPECIFIED CHRONICITY, UNSPECIFIED LATERALITY: ICD-10-CM

## 2021-06-05 PROCEDURE — 95806 SLEEP STUDY UNATT&RESP EFFT: CPT | Mod: 26,52,S$GLB, | Performed by: INTERNAL MEDICINE

## 2021-06-05 PROCEDURE — 95806 PR SLEEP STUDY, UNATTENDED, SIMUL RECORD HR/O2 SAT/RESP FLOW/RESP EFFT: ICD-10-PCS | Mod: 26,52,S$GLB, | Performed by: INTERNAL MEDICINE

## 2021-06-07 ENCOUNTER — TELEPHONE (OUTPATIENT)
Dept: FAMILY MEDICINE | Facility: CLINIC | Age: 36
End: 2021-06-07

## 2021-06-07 DIAGNOSIS — R40.0 DAYTIME SOMNOLENCE: Primary | ICD-10-CM

## 2021-06-07 DIAGNOSIS — R53.82 CHRONIC FATIGUE: ICD-10-CM

## 2021-06-07 DIAGNOSIS — R06.83 SNORING: ICD-10-CM

## 2021-06-08 ENCOUNTER — HOSPITAL ENCOUNTER (OUTPATIENT)
Dept: RADIOLOGY | Facility: HOSPITAL | Age: 36
Discharge: HOME | End: 2021-06-08
Attending: ORTHOPAEDIC SURGERY
Payer: COMMERCIAL

## 2021-06-08 DIAGNOSIS — G89.29 CHRONIC PAIN OF LEFT KNEE: ICD-10-CM

## 2021-06-08 DIAGNOSIS — M25.562 CHRONIC PAIN OF LEFT KNEE: ICD-10-CM

## 2021-06-08 PROCEDURE — 73700 CT LOWER EXTREMITY W/O DYE: CPT | Mod: 26,LT,, | Performed by: RADIOLOGY

## 2021-06-08 PROCEDURE — 73700 CT KNEE WITHOUT CONTRAST LEFT: ICD-10-PCS | Mod: 26,LT,, | Performed by: RADIOLOGY

## 2021-06-08 PROCEDURE — 73700 CT LOWER EXTREMITY W/O DYE: CPT | Mod: TC,PO,LT

## 2021-06-10 ENCOUNTER — PATIENT MESSAGE (OUTPATIENT)
Dept: FAMILY MEDICINE | Facility: CLINIC | Age: 36
End: 2021-06-10

## 2021-06-14 ENCOUNTER — OFFICE VISIT (OUTPATIENT)
Dept: ORTHOPEDICS | Facility: CLINIC | Age: 36
End: 2021-06-14
Payer: COMMERCIAL

## 2021-06-14 VITALS — BODY MASS INDEX: 23.22 KG/M2 | HEIGHT: 64 IN | WEIGHT: 136 LBS

## 2021-06-14 DIAGNOSIS — M25.569 PAIN IN PATELLA, UNSPECIFIED LATERALITY: Primary | ICD-10-CM

## 2021-06-14 DIAGNOSIS — M25.569 KNEE PAIN, UNSPECIFIED CHRONICITY, UNSPECIFIED LATERALITY: Primary | ICD-10-CM

## 2021-06-14 PROCEDURE — 1125F PR PAIN SEVERITY QUANTIFIED, PAIN PRESENT: ICD-10-PCS | Mod: S$GLB,,, | Performed by: ORTHOPAEDIC SURGERY

## 2021-06-14 PROCEDURE — 3008F PR BODY MASS INDEX (BMI) DOCUMENTED: ICD-10-PCS | Mod: CPTII,S$GLB,, | Performed by: ORTHOPAEDIC SURGERY

## 2021-06-14 PROCEDURE — 99213 PR OFFICE/OUTPT VISIT, EST, LEVL III, 20-29 MIN: ICD-10-PCS | Mod: S$GLB,,, | Performed by: ORTHOPAEDIC SURGERY

## 2021-06-14 PROCEDURE — 99213 OFFICE O/P EST LOW 20 MIN: CPT | Mod: S$GLB,,, | Performed by: ORTHOPAEDIC SURGERY

## 2021-06-14 PROCEDURE — 3008F BODY MASS INDEX DOCD: CPT | Mod: CPTII,S$GLB,, | Performed by: ORTHOPAEDIC SURGERY

## 2021-06-14 PROCEDURE — 1125F AMNT PAIN NOTED PAIN PRSNT: CPT | Mod: S$GLB,,, | Performed by: ORTHOPAEDIC SURGERY

## 2021-06-14 PROCEDURE — 99999 PR PBB SHADOW E&M-EST. PATIENT-LVL III: ICD-10-PCS | Mod: PBBFAC,,, | Performed by: ORTHOPAEDIC SURGERY

## 2021-06-14 PROCEDURE — 99999 PR PBB SHADOW E&M-EST. PATIENT-LVL III: CPT | Mod: PBBFAC,,, | Performed by: ORTHOPAEDIC SURGERY

## 2021-06-24 ENCOUNTER — PATIENT MESSAGE (OUTPATIENT)
Dept: FAMILY MEDICINE | Facility: CLINIC | Age: 36
End: 2021-06-24

## 2021-06-24 DIAGNOSIS — M25.50 ARTHRALGIA, UNSPECIFIED JOINT: ICD-10-CM

## 2021-06-24 DIAGNOSIS — R53.82 CHRONIC FATIGUE: Primary | ICD-10-CM

## 2021-07-01 ENCOUNTER — OFFICE VISIT (OUTPATIENT)
Dept: PULMONOLOGY | Facility: CLINIC | Age: 36
End: 2021-07-01
Payer: COMMERCIAL

## 2021-07-01 ENCOUNTER — PATIENT MESSAGE (OUTPATIENT)
Dept: FAMILY MEDICINE | Facility: CLINIC | Age: 36
End: 2021-07-01

## 2021-07-01 VITALS
DIASTOLIC BLOOD PRESSURE: 60 MMHG | BODY MASS INDEX: 22.36 KG/M2 | HEIGHT: 64 IN | HEART RATE: 71 BPM | SYSTOLIC BLOOD PRESSURE: 87 MMHG | WEIGHT: 131 LBS

## 2021-07-01 DIAGNOSIS — G47.33 OSA (OBSTRUCTIVE SLEEP APNEA): ICD-10-CM

## 2021-07-01 DIAGNOSIS — R40.0 DAYTIME SLEEPINESS: Primary | ICD-10-CM

## 2021-07-01 PROCEDURE — 99203 PR OFFICE/OUTPT VISIT, NEW, LEVL III, 30-44 MIN: ICD-10-PCS | Mod: 95,,, | Performed by: INTERNAL MEDICINE

## 2021-07-01 PROCEDURE — 3008F PR BODY MASS INDEX (BMI) DOCUMENTED: ICD-10-PCS | Mod: CPTII,,, | Performed by: INTERNAL MEDICINE

## 2021-07-01 PROCEDURE — 99203 OFFICE O/P NEW LOW 30 MIN: CPT | Mod: 95,,, | Performed by: INTERNAL MEDICINE

## 2021-07-01 PROCEDURE — 3008F BODY MASS INDEX DOCD: CPT | Mod: CPTII,,, | Performed by: INTERNAL MEDICINE

## 2021-07-07 DIAGNOSIS — Z20.822 ENCOUNTER FOR LABORATORY TESTING FOR COVID-19 VIRUS: ICD-10-CM

## 2021-10-12 DIAGNOSIS — M25.50 ARTHRALGIA, UNSPECIFIED JOINT: ICD-10-CM

## 2021-10-12 DIAGNOSIS — F32.A ANXIETY AND DEPRESSION: ICD-10-CM

## 2021-10-12 DIAGNOSIS — F41.9 ANXIETY AND DEPRESSION: ICD-10-CM

## 2021-10-12 RX ORDER — VENLAFAXINE HYDROCHLORIDE 150 MG/1
150 CAPSULE, EXTENDED RELEASE ORAL DAILY
Qty: 90 CAPSULE | Refills: 3 | Status: SHIPPED | OUTPATIENT
Start: 2021-10-12 | End: 2022-09-19

## 2021-11-02 ENCOUNTER — PATIENT MESSAGE (OUTPATIENT)
Dept: FAMILY MEDICINE | Facility: CLINIC | Age: 36
End: 2021-11-02
Payer: COMMERCIAL

## 2021-11-02 DIAGNOSIS — R53.82 CHRONIC FATIGUE: Primary | ICD-10-CM

## 2021-11-02 DIAGNOSIS — R40.0 DAYTIME SOMNOLENCE: ICD-10-CM

## 2021-11-02 DIAGNOSIS — R06.83 SNORING: ICD-10-CM

## 2021-11-05 ENCOUNTER — PATIENT MESSAGE (OUTPATIENT)
Dept: FAMILY MEDICINE | Facility: CLINIC | Age: 36
End: 2021-11-05
Payer: COMMERCIAL

## 2021-11-08 ENCOUNTER — PATIENT MESSAGE (OUTPATIENT)
Dept: FAMILY MEDICINE | Facility: CLINIC | Age: 36
End: 2021-11-08
Payer: COMMERCIAL

## 2022-04-27 ENCOUNTER — PATIENT MESSAGE (OUTPATIENT)
Dept: ADMINISTRATIVE | Facility: HOSPITAL | Age: 37
End: 2022-04-27
Payer: COMMERCIAL

## 2022-05-14 ENCOUNTER — PATIENT MESSAGE (OUTPATIENT)
Dept: ADMINISTRATIVE | Facility: HOSPITAL | Age: 37
End: 2022-05-14
Payer: COMMERCIAL

## 2022-05-16 ENCOUNTER — OFFICE VISIT (OUTPATIENT)
Dept: FAMILY MEDICINE | Facility: CLINIC | Age: 37
End: 2022-05-16
Payer: COMMERCIAL

## 2022-05-16 ENCOUNTER — LAB VISIT (OUTPATIENT)
Dept: LAB | Facility: HOSPITAL | Age: 37
End: 2022-05-16
Attending: INTERNAL MEDICINE
Payer: COMMERCIAL

## 2022-05-16 ENCOUNTER — TELEPHONE (OUTPATIENT)
Dept: FAMILY MEDICINE | Facility: CLINIC | Age: 37
End: 2022-05-16
Payer: COMMERCIAL

## 2022-05-16 VITALS
DIASTOLIC BLOOD PRESSURE: 97 MMHG | BODY MASS INDEX: 22.99 KG/M2 | WEIGHT: 134.63 LBS | HEIGHT: 64 IN | SYSTOLIC BLOOD PRESSURE: 148 MMHG | TEMPERATURE: 100 F | HEART RATE: 85 BPM

## 2022-05-16 DIAGNOSIS — M32.8 OTHER FORMS OF SYSTEMIC LUPUS ERYTHEMATOSUS, UNSPECIFIED ORGAN INVOLVEMENT STATUS: Primary | ICD-10-CM

## 2022-05-16 DIAGNOSIS — G43.409 HEMIPLEGIC MIGRAINE WITHOUT STATUS MIGRAINOSUS, NOT INTRACTABLE: ICD-10-CM

## 2022-05-16 DIAGNOSIS — F32.A ANXIETY AND DEPRESSION: ICD-10-CM

## 2022-05-16 DIAGNOSIS — F41.9 ANXIETY AND DEPRESSION: ICD-10-CM

## 2022-05-16 DIAGNOSIS — M32.8 OTHER FORMS OF SYSTEMIC LUPUS ERYTHEMATOSUS, UNSPECIFIED ORGAN INVOLVEMENT STATUS: ICD-10-CM

## 2022-05-16 DIAGNOSIS — M51.36 DDD (DEGENERATIVE DISC DISEASE), LUMBAR: ICD-10-CM

## 2022-05-16 DIAGNOSIS — Z13.6 ENCOUNTER FOR LIPID SCREENING FOR CARDIOVASCULAR DISEASE: ICD-10-CM

## 2022-05-16 DIAGNOSIS — Z00.00 ENCOUNTER FOR ANNUAL HEALTH EXAMINATION: ICD-10-CM

## 2022-05-16 DIAGNOSIS — Z13.220 ENCOUNTER FOR LIPID SCREENING FOR CARDIOVASCULAR DISEASE: ICD-10-CM

## 2022-05-16 DIAGNOSIS — F43.10 PTSD (POST-TRAUMATIC STRESS DISORDER): ICD-10-CM

## 2022-05-16 PROBLEM — M32.9 LUPUS (SYSTEMIC LUPUS ERYTHEMATOSUS): Status: ACTIVE | Noted: 2022-05-16

## 2022-05-16 LAB
ALBUMIN SERPL BCP-MCNC: 4.2 G/DL (ref 3.5–5.2)
ALP SERPL-CCNC: 44 U/L (ref 55–135)
ALT SERPL W/O P-5'-P-CCNC: 21 U/L (ref 10–44)
ANION GAP SERPL CALC-SCNC: 14 MMOL/L (ref 8–16)
AST SERPL-CCNC: 18 U/L (ref 10–40)
BASOPHILS # BLD AUTO: 0.08 K/UL (ref 0–0.2)
BASOPHILS NFR BLD: 0.6 % (ref 0–1.9)
BILIRUB SERPL-MCNC: 0.5 MG/DL (ref 0.1–1)
BUN SERPL-MCNC: 13 MG/DL (ref 6–20)
C3 SERPL-MCNC: 126 MG/DL (ref 50–180)
C4 SERPL-MCNC: 19 MG/DL (ref 11–44)
CALCIUM SERPL-MCNC: 9.6 MG/DL (ref 8.7–10.5)
CHLORIDE SERPL-SCNC: 102 MMOL/L (ref 95–110)
CHOLEST SERPL-MCNC: 223 MG/DL (ref 120–199)
CHOLEST/HDLC SERPL: 3.7 {RATIO} (ref 2–5)
CO2 SERPL-SCNC: 24 MMOL/L (ref 23–29)
CREAT SERPL-MCNC: 0.7 MG/DL (ref 0.5–1.4)
CRP SERPL-MCNC: 0.7 MG/L (ref 0–8.2)
DIFFERENTIAL METHOD: ABNORMAL
EOSINOPHIL # BLD AUTO: 0 K/UL (ref 0–0.5)
EOSINOPHIL NFR BLD: 0.3 % (ref 0–8)
ERYTHROCYTE [DISTWIDTH] IN BLOOD BY AUTOMATED COUNT: 11.9 % (ref 11.5–14.5)
ERYTHROCYTE [SEDIMENTATION RATE] IN BLOOD BY WESTERGREN METHOD: 3 MM/HR (ref 0–20)
EST. GFR  (AFRICAN AMERICAN): >60 ML/MIN/1.73 M^2
EST. GFR  (NON AFRICAN AMERICAN): >60 ML/MIN/1.73 M^2
GLUCOSE SERPL-MCNC: 75 MG/DL (ref 70–110)
HCT VFR BLD AUTO: 44.4 % (ref 37–48.5)
HDLC SERPL-MCNC: 61 MG/DL (ref 40–75)
HDLC SERPL: 27.4 % (ref 20–50)
HGB BLD-MCNC: 14.4 G/DL (ref 12–16)
IMM GRANULOCYTES # BLD AUTO: 0.06 K/UL (ref 0–0.04)
IMM GRANULOCYTES NFR BLD AUTO: 0.5 % (ref 0–0.5)
LDLC SERPL CALC-MCNC: 122 MG/DL (ref 63–159)
LYMPHOCYTES # BLD AUTO: 2.6 K/UL (ref 1–4.8)
LYMPHOCYTES NFR BLD: 20 % (ref 18–48)
MCH RBC QN AUTO: 29.8 PG (ref 27–31)
MCHC RBC AUTO-ENTMCNC: 32.4 G/DL (ref 32–36)
MCV RBC AUTO: 92 FL (ref 82–98)
MONOCYTES # BLD AUTO: 1 K/UL (ref 0.3–1)
MONOCYTES NFR BLD: 7.5 % (ref 4–15)
NEUTROPHILS # BLD AUTO: 9.4 K/UL (ref 1.8–7.7)
NEUTROPHILS NFR BLD: 71.1 % (ref 38–73)
NONHDLC SERPL-MCNC: 162 MG/DL
NRBC BLD-RTO: 0 /100 WBC
PLATELET # BLD AUTO: 240 K/UL (ref 150–450)
PMV BLD AUTO: 11.1 FL (ref 9.2–12.9)
POTASSIUM SERPL-SCNC: 4 MMOL/L (ref 3.5–5.1)
PROT SERPL-MCNC: 7 G/DL (ref 6–8.4)
RBC # BLD AUTO: 4.84 M/UL (ref 4–5.4)
SODIUM SERPL-SCNC: 140 MMOL/L (ref 136–145)
TRIGL SERPL-MCNC: 200 MG/DL (ref 30–150)
TSH SERPL DL<=0.005 MIU/L-ACNC: 1.03 UIU/ML (ref 0.4–4)
WBC # BLD AUTO: 13.15 K/UL (ref 3.9–12.7)

## 2022-05-16 PROCEDURE — 3077F SYST BP >= 140 MM HG: CPT | Mod: CPTII,S$GLB,, | Performed by: PHYSICIAN ASSISTANT

## 2022-05-16 PROCEDURE — 1159F PR MEDICATION LIST DOCUMENTED IN MEDICAL RECORD: ICD-10-PCS | Mod: CPTII,S$GLB,, | Performed by: PHYSICIAN ASSISTANT

## 2022-05-16 PROCEDURE — 99999 PR PBB SHADOW E&M-EST. PATIENT-LVL V: CPT | Mod: PBBFAC,,, | Performed by: PHYSICIAN ASSISTANT

## 2022-05-16 PROCEDURE — 80053 COMPREHEN METABOLIC PANEL: CPT | Performed by: PHYSICIAN ASSISTANT

## 2022-05-16 PROCEDURE — 3008F BODY MASS INDEX DOCD: CPT | Mod: CPTII,S$GLB,, | Performed by: PHYSICIAN ASSISTANT

## 2022-05-16 PROCEDURE — 84443 ASSAY THYROID STIM HORMONE: CPT | Performed by: PHYSICIAN ASSISTANT

## 2022-05-16 PROCEDURE — 86160 COMPLEMENT ANTIGEN: CPT | Mod: 59 | Performed by: PHYSICIAN ASSISTANT

## 2022-05-16 PROCEDURE — 3080F DIAST BP >= 90 MM HG: CPT | Mod: CPTII,S$GLB,, | Performed by: PHYSICIAN ASSISTANT

## 2022-05-16 PROCEDURE — 3080F PR MOST RECENT DIASTOLIC BLOOD PRESSURE >= 90 MM HG: ICD-10-PCS | Mod: CPTII,S$GLB,, | Performed by: PHYSICIAN ASSISTANT

## 2022-05-16 PROCEDURE — 3077F PR MOST RECENT SYSTOLIC BLOOD PRESSURE >= 140 MM HG: ICD-10-PCS | Mod: CPTII,S$GLB,, | Performed by: PHYSICIAN ASSISTANT

## 2022-05-16 PROCEDURE — 3008F PR BODY MASS INDEX (BMI) DOCUMENTED: ICD-10-PCS | Mod: CPTII,S$GLB,, | Performed by: PHYSICIAN ASSISTANT

## 2022-05-16 PROCEDURE — 1159F MED LIST DOCD IN RCRD: CPT | Mod: CPTII,S$GLB,, | Performed by: PHYSICIAN ASSISTANT

## 2022-05-16 PROCEDURE — 85651 RBC SED RATE NONAUTOMATED: CPT | Mod: PO | Performed by: PHYSICIAN ASSISTANT

## 2022-05-16 PROCEDURE — 36415 COLL VENOUS BLD VENIPUNCTURE: CPT | Mod: PO | Performed by: PHYSICIAN ASSISTANT

## 2022-05-16 PROCEDURE — 1160F PR REVIEW ALL MEDS BY PRESCRIBER/CLIN PHARMACIST DOCUMENTED: ICD-10-PCS | Mod: CPTII,S$GLB,, | Performed by: PHYSICIAN ASSISTANT

## 2022-05-16 PROCEDURE — 1160F RVW MEDS BY RX/DR IN RCRD: CPT | Mod: CPTII,S$GLB,, | Performed by: PHYSICIAN ASSISTANT

## 2022-05-16 PROCEDURE — 99214 PR OFFICE/OUTPT VISIT, EST, LEVL IV, 30-39 MIN: ICD-10-PCS | Mod: S$GLB,,, | Performed by: PHYSICIAN ASSISTANT

## 2022-05-16 PROCEDURE — 99214 OFFICE O/P EST MOD 30 MIN: CPT | Mod: S$GLB,,, | Performed by: PHYSICIAN ASSISTANT

## 2022-05-16 PROCEDURE — 99999 PR PBB SHADOW E&M-EST. PATIENT-LVL V: ICD-10-PCS | Mod: PBBFAC,,, | Performed by: PHYSICIAN ASSISTANT

## 2022-05-16 PROCEDURE — 85025 COMPLETE CBC W/AUTO DIFF WBC: CPT | Performed by: PHYSICIAN ASSISTANT

## 2022-05-16 PROCEDURE — 86140 C-REACTIVE PROTEIN: CPT | Performed by: PHYSICIAN ASSISTANT

## 2022-05-16 PROCEDURE — 80061 LIPID PANEL: CPT | Performed by: PHYSICIAN ASSISTANT

## 2022-05-16 PROCEDURE — 86160 COMPLEMENT ANTIGEN: CPT | Performed by: PHYSICIAN ASSISTANT

## 2022-05-16 RX ORDER — LEFLUNOMIDE 20 MG/1
20 TABLET ORAL
COMMUNITY
Start: 2022-03-24 | End: 2023-04-06

## 2022-05-16 RX ORDER — HYDROXYCHLOROQUINE SULFATE 200 MG/1
1 TABLET, FILM COATED ORAL 2 TIMES DAILY
COMMUNITY
Start: 2022-03-24 | End: 2023-09-08

## 2022-05-16 RX ORDER — DICLOFENAC SODIUM 75 MG/1
75 TABLET, DELAYED RELEASE ORAL
COMMUNITY
Start: 2022-01-28 | End: 2023-04-06

## 2022-05-16 RX ORDER — PREDNISONE 20 MG/1
20 TABLET ORAL
COMMUNITY
Start: 2022-01-28

## 2022-05-16 RX ORDER — ALPRAZOLAM 0.5 MG/1
0.5 TABLET ORAL EVERY 6 HOURS PRN
COMMUNITY
Start: 2022-05-14 | End: 2022-07-12

## 2022-05-16 RX ORDER — MOMETASONE FUROATE 50 UG/1
SPRAY, METERED NASAL
COMMUNITY
Start: 2022-01-25

## 2022-05-16 RX ORDER — COLCHICINE 0.6 MG/1
1 TABLET ORAL 2 TIMES DAILY
COMMUNITY
Start: 2022-04-11

## 2022-05-16 NOTE — PROGRESS NOTES
Assessment/Plan:    Problem List Items Addressed This Visit        Neuro    DDD (degenerative disc disease), lumbar    Overview     -chronic hx of lower back pain, s/p surgical intervention in past with hardware and nerve stimulator in place           Migraines    Overview     -hx of hemiplegic migraines  -Followed by neurology, Dr. Warren  -Currently on emgality 120 mg monthly with improvement of headaches  -Has PRN fiorect for abortive therapy              Psychiatric    PTSD (post-traumatic stress disorder)    Overview     -previously followed by psychiatry in the past but no longer is  -worsening of panic attacks over the past few weeks, different than usual panic attacks  -currently on Effexor  mg daily and was recently started on Xanax PRN by rheumatology  -refer to psychiatry           Relevant Orders    Ambulatory referral/consult to Psychiatry    Anxiety and depression    Overview     -previously followed by psychiatry in the past but no longer is  -worsening of anxiety over the past few weeks with mood changes and panic attacks  -currently on Effexor  mg daily and was recently started on Xanax PRN by rheumatology  -has tried and failed multiple other antidepressants including Zoloft and Cymbalta  -refer to psychiatry               Relevant Orders    Ambulatory referral/consult to Psychiatry       Immunology/Multi System    Lupus (systemic lupus erythematosus) - Primary    Overview     -generalized joint pain and stiffness, livedo reticularis rash, severe fatigue  -worsening of joint pain, stiffness, and swelling over the past week  -currently on colchicine 0.6 mg BID, prednisone 20 mg QD, plaquenil 200 mg BID and arava 20 mg QD  -reports being told by rheumatology that she likely has rheumatoid arthritis and lupus  -followed by rheumatology             Relevant Orders    Sedimentation rate    C-Reactive Protein    C3 COMPLEMENT    C4 COMPLEMENT      Other Visit Diagnoses     Encounter for  annual health examination        Relevant Orders    Comprehensive Metabolic Panel    CBC Auto Differential    TSH    Encounter for lipid screening for cardiovascular disease        Relevant Orders    Lipid Panel          Follow up in about 6 months (around 11/16/2022).   BP elevated at today's visit, follow up in 2 weeks for BP check.    Aliyah Schaeffer PA-C  _____________________________________________________________________________________________________________________________________________________    CC: follow up for chronic conditions     HPI: Patient is in clinic today as an established patient here for follow up for chronic conditions.    Joint/Muscle Pain: Patient complains of worsening of joint pain and stiffness over the past week. She reports having associated joint swelling and feeling like she had fluid in her abdomen and head causing vertigo. She is currently being followed by rheumatology in which she was told that she likely had lupus and rheumatoid arthritis. She is currently on colchicine 0.6 mg BID, prednisone 20 mg QD, plaquenil 200 mg BID and arava 20 mg QD per rheumatology.     Anxiety: Patient complains of anxiety, panic attacks, and post traumatic stress disorder. Onset of symptoms was several years ago but she reports worsening of anxiety, mood changes, and atypical panic attacks occurring over the past few weeks. She denies current suicidal and homicidal ideation. She is currently on Effexor  mg daily and was recently prescribed Xanax prn by her rheumatologist. She has tried and failed multiple other antidepressants including Zoloft and Cymbalta. She was previously being followed by psychiatry, but has not been seen in several months.     Remaining chronic conditions have been reviewed and remain stable. Further detail as stated above.     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    Arteritis     2015    DDD (degenerative disc disease),  cervical     DDD (degenerative disc disease), lumbar     Endometriosis     Heart palpitations     History of lumbar spinal fusion     Pain, pelvic, female     PTSD (post-traumatic stress disorder)     Renal disorder     kidney stones      Past Surgical History:   Procedure Laterality Date    APPENDECTOMY      BILATERAL SALPINGO-OOPHORECTOMY (BSO) Bilateral 10/15/2020    Procedure: SALPINGO-OOPHORECTOMY, BILATERAL;  Surgeon: Zahira Membreno MD;  Location: [facility] OR;  Service: OB/GYN;  Laterality: Bilateral;     SECTION, CLASSIC      x 3    DILATION AND CURETTAGE OF UTERUS      x 2    HYSTERECTOMY  10/15/2016    IMPLANTATION OF PERMANENT SACRAL NERVE STIMULATOR Left     since 2017, Dr Jared [facility]ep[facility]     LAPAROSCOPIC SALPINGO-OOPHORECTOMY Bilateral 10/15/2020    Procedure: SALPINGO-OOPHORECTOMY, LAPAROSCOPIC-attempted;  Surgeon: Zahira Membreno MD;  Location: [facility] OR;  Service: OB/GYN;  Laterality: Bilateral;    LUMBAR EPIDURAL INJECTION      multiple times over last eight yrs    LUMBAR FUSION  12/31/15    LYSIS OF ADHESIONS N/A 10/15/2020    Procedure: LYSIS, ADHESIONS;  Surgeon: Zahira Membreno MD;  Location: [facility] OR;  Service: OB/GYN;  Laterality: N/A;    PELVIC LAPAROSCOPY      for endometriosis    RHIZOTOMY W/ RADIOFREQUENCY ABLATION      several ( back and neck ) over past 8 yrs    tonsillectomy      TONSILLECTOMY       Review of patient's allergies indicates:   Allergen Reactions    Adhesive Blisters, Dermatitis, Hives, Itching and Swelling    Imitrex [sumatriptan] Hallucinations    Venom-wasp     Bee venom protein (honey bee)     Eggs [egg derived] Diarrhea     And nausea    Norelgestromin-ethin.estradiol Other (See Comments) and Rash     Chest pains     Social History     Tobacco Use    Smoking status: Never Smoker    Smokeless tobacco: Never Used   Substance Use Topics    Alcohol use: No    Drug use: No     Family History   Problem Relation Age of Onset     Hypertension Mother     Heart disease Mother     Hyperlipidemia Mother     Arthritis Mother     Depression Father     Suicide Father 32    No Known Problems Sister     No Known Problems Brother     No Known Problems Brother      Current Outpatient Medications on File Prior to Visit   Medication Sig Dispense Refill    colchicine (COLCRYS) 0.6 mg tablet Take 1 tablet by mouth 2 (two) times daily.      diclofenac (VOLTAREN) 75 MG EC tablet Take 75 mg by mouth.      estradioL (ESTRACE) 2 MG tablet Take 2 mg by mouth once daily.      hydrOXYchloroQUINE (PLAQUENIL) 200 mg tablet Take 1 tablet by mouth 2 (two) times daily.      leflunomide (ARAVA) 20 MG Tab Take 20 mg by mouth.      mometasone (NASONEX) 50 mcg/actuation nasal spray SPRAY TWO sprays IN EACH NOSTRIL DAILY FOR FOURTEEN DAYS      montelukast (SINGULAIR) 10 mg tablet TAKE 1 TABLET BY MOUTH  EVERY EVENING 90 tablet 3    omeprazole (PRILOSEC) 40 MG capsule Take 40 mg by mouth nightly.      predniSONE (DELTASONE) 20 MG tablet Take 20 mg by mouth.      venlafaxine (EFFEXOR-XR) 150 MG Cp24 Take 1 capsule (150 mg total) by mouth once daily. 90 capsule 3    ALPRAZolam (XANAX) 0.5 MG tablet Take 0.5 mg by mouth every 6 (six) hours as needed.      butalbital-acetaminophen-caffeine -40 mg (FIORICET, ESGIC) -40 mg per tablet Take 1 tablet by mouth every 6 (six) hours as needed for Headaches.       EMGALITY  mg/mL PnIj Inject 120 mg into the skin every 28 days  6    EPINEPHrine (EPIPEN) 0.3 mg/0.3 mL AtIn Inject 0.3 mg into the muscle as needed.       fluticasone (FLONASE) 50 mcg/actuation nasal spray as needed.       gabapentin (NEURONTIN) 300 MG capsule Take 300 mg by mouth 3 (three) times daily.      tiZANidine (ZANAFLEX) 4 MG tablet Take 1 tablet (4 mg total) by mouth every 8 (eight) hours. (Patient not taking: Reported on 5/16/2022) 60 tablet 3     Current Facility-Administered Medications on File Prior to Visit   Medication  "Dose Route Frequency Provider Last Rate Last Admin    lactated ringers infusion   Intravenous Continuous Zahira Membreno MD   New Bag at 10/15/20 0759    lorazepam injection 0.25 mg  0.25 mg Intravenous Q15 Min PRN Jacqueline Volpi-Abadie, MD   0.25 mg at 10/15/20 0905    ondansetron injection 4 mg  4 mg Intravenous Daily PRN Jacqueline Volpi-Abadie, MD        oxyCODONE-acetaminophen 5-325 mg per tablet 1 tablet  1 tablet Oral Q3H PRN Jacqueline Volpi-Abadie, MD   1 tablet at 10/16/20 0817    sodium chloride 0.9% flush 3 mL  3 mL Intravenous PRN Jacqueline Volpi-Abadie, MD           Review of Systems   Constitutional: Negative for chills, diaphoresis, fatigue and fever.   HENT: Negative for congestion, ear pain, postnasal drip, sinus pain and sore throat.    Eyes: Negative for pain and redness.   Respiratory: Negative for cough, chest tightness and shortness of breath.    Cardiovascular: Negative for chest pain and leg swelling.   Gastrointestinal: Negative for abdominal pain, constipation, diarrhea, nausea and vomiting.   Genitourinary: Negative for dysuria and hematuria.   Musculoskeletal: Positive for arthralgias and joint swelling.   Skin: Negative for rash.   Neurological: Negative for dizziness, syncope and headaches.   Psychiatric/Behavioral: The patient is nervous/anxious.        Vitals:    05/16/22 0840 05/16/22 0934   BP: (!) 140/98 (!) 148/97   Pulse: 85    Temp: 99.5 °F (37.5 °C)    TempSrc: Oral    Weight: 61.1 kg (134 lb 9.6 oz)    Height: 5' 4" (1.626 m)        Wt Readings from Last 3 Encounters:   05/16/22 61.1 kg (134 lb 9.6 oz)   07/01/21 59.4 kg (131 lb)   06/14/21 61.7 kg (136 lb)       Physical Exam  Constitutional:       General: She is not in acute distress.     Appearance: Normal appearance. She is well-developed.   HENT:      Head: Normocephalic and atraumatic.   Eyes:      Conjunctiva/sclera: Conjunctivae normal.   Cardiovascular:      Rate and Rhythm: Normal rate and regular rhythm. "      Pulses: Normal pulses.      Heart sounds: Normal heart sounds. No murmur heard.  Pulmonary:      Effort: Pulmonary effort is normal. No respiratory distress.      Breath sounds: Normal breath sounds.   Abdominal:      General: Bowel sounds are normal. There is no distension.      Palpations: Abdomen is soft.      Tenderness: There is no abdominal tenderness.   Musculoskeletal:         General: No swelling. Normal range of motion.      Cervical back: Normal range of motion and neck supple.      Comments: No swelling present on exam   Skin:     General: Skin is warm and dry.      Findings: No rash.   Neurological:      General: No focal deficit present.      Mental Status: She is alert and oriented to person, place, and time.         Health Maintenance   Topic Date Due    Lipid Panel  06/28/2022    TETANUS VACCINE  04/02/2024    Hepatitis C Screening  Completed

## 2022-05-16 NOTE — TELEPHONE ENCOUNTER
Patient BP high in clinic after recheck. Patient has home BP cuff and will send in 2 week check. Please follow up

## 2022-05-26 ENCOUNTER — PATIENT MESSAGE (OUTPATIENT)
Dept: PSYCHIATRY | Facility: CLINIC | Age: 37
End: 2022-05-26
Payer: COMMERCIAL

## 2022-05-26 ENCOUNTER — TELEPHONE (OUTPATIENT)
Dept: PSYCHIATRY | Facility: CLINIC | Age: 37
End: 2022-05-26
Payer: COMMERCIAL

## 2022-05-26 NOTE — TELEPHONE ENCOUNTER
left vm to return call to schedule with a different provider and appointment with Nu has been canceled.

## 2022-05-27 ENCOUNTER — PATIENT MESSAGE (OUTPATIENT)
Dept: FAMILY MEDICINE | Facility: CLINIC | Age: 37
End: 2022-05-27
Payer: COMMERCIAL

## 2022-05-30 ENCOUNTER — PATIENT OUTREACH (OUTPATIENT)
Dept: ADMINISTRATIVE | Facility: HOSPITAL | Age: 37
End: 2022-05-30
Payer: COMMERCIAL

## 2022-05-30 ENCOUNTER — PATIENT MESSAGE (OUTPATIENT)
Dept: ADMINISTRATIVE | Facility: HOSPITAL | Age: 37
End: 2022-05-30
Payer: COMMERCIAL

## 2022-06-27 ENCOUNTER — OFFICE VISIT (OUTPATIENT)
Dept: FAMILY MEDICINE | Facility: CLINIC | Age: 37
End: 2022-06-27
Payer: COMMERCIAL

## 2022-06-27 VITALS
DIASTOLIC BLOOD PRESSURE: 83 MMHG | BODY MASS INDEX: 23.27 KG/M2 | HEART RATE: 119 BPM | SYSTOLIC BLOOD PRESSURE: 124 MMHG | TEMPERATURE: 100 F | HEIGHT: 64 IN | WEIGHT: 136.31 LBS

## 2022-06-27 DIAGNOSIS — J06.9 VIRAL UPPER RESPIRATORY TRACT INFECTION: Primary | ICD-10-CM

## 2022-06-27 LAB
CTP QC/QA: YES
FLUAV AG NPH QL: NEGATIVE
FLUBV AG NPH QL: NEGATIVE

## 2022-06-27 PROCEDURE — 3008F PR BODY MASS INDEX (BMI) DOCUMENTED: ICD-10-PCS | Mod: CPTII,S$GLB,, | Performed by: PHYSICIAN ASSISTANT

## 2022-06-27 PROCEDURE — 99213 OFFICE O/P EST LOW 20 MIN: CPT | Mod: S$GLB,,, | Performed by: PHYSICIAN ASSISTANT

## 2022-06-27 PROCEDURE — 1159F PR MEDICATION LIST DOCUMENTED IN MEDICAL RECORD: ICD-10-PCS | Mod: CPTII,S$GLB,, | Performed by: PHYSICIAN ASSISTANT

## 2022-06-27 PROCEDURE — U0003 INFECTIOUS AGENT DETECTION BY NUCLEIC ACID (DNA OR RNA); SEVERE ACUTE RESPIRATORY SYNDROME CORONAVIRUS 2 (SARS-COV-2) (CORONAVIRUS DISEASE [COVID-19]), AMPLIFIED PROBE TECHNIQUE, MAKING USE OF HIGH THROUGHPUT TECHNOLOGIES AS DESCRIBED BY CMS-2020-01-R: HCPCS | Performed by: PHYSICIAN ASSISTANT

## 2022-06-27 PROCEDURE — U0005 INFEC AGEN DETEC AMPLI PROBE: HCPCS | Performed by: PHYSICIAN ASSISTANT

## 2022-06-27 PROCEDURE — 1160F RVW MEDS BY RX/DR IN RCRD: CPT | Mod: CPTII,S$GLB,, | Performed by: PHYSICIAN ASSISTANT

## 2022-06-27 PROCEDURE — 1159F MED LIST DOCD IN RCRD: CPT | Mod: CPTII,S$GLB,, | Performed by: PHYSICIAN ASSISTANT

## 2022-06-27 PROCEDURE — 3079F DIAST BP 80-89 MM HG: CPT | Mod: CPTII,S$GLB,, | Performed by: PHYSICIAN ASSISTANT

## 2022-06-27 PROCEDURE — 1160F PR REVIEW ALL MEDS BY PRESCRIBER/CLIN PHARMACIST DOCUMENTED: ICD-10-PCS | Mod: CPTII,S$GLB,, | Performed by: PHYSICIAN ASSISTANT

## 2022-06-27 PROCEDURE — 87804 POCT INFLUENZA A/B: ICD-10-PCS | Mod: 59,QW,S$GLB, | Performed by: PHYSICIAN ASSISTANT

## 2022-06-27 PROCEDURE — 99999 PR PBB SHADOW E&M-EST. PATIENT-LVL V: ICD-10-PCS | Mod: PBBFAC,,, | Performed by: PHYSICIAN ASSISTANT

## 2022-06-27 PROCEDURE — 3008F BODY MASS INDEX DOCD: CPT | Mod: CPTII,S$GLB,, | Performed by: PHYSICIAN ASSISTANT

## 2022-06-27 PROCEDURE — 3079F PR MOST RECENT DIASTOLIC BLOOD PRESSURE 80-89 MM HG: ICD-10-PCS | Mod: CPTII,S$GLB,, | Performed by: PHYSICIAN ASSISTANT

## 2022-06-27 PROCEDURE — 3074F SYST BP LT 130 MM HG: CPT | Mod: CPTII,S$GLB,, | Performed by: PHYSICIAN ASSISTANT

## 2022-06-27 PROCEDURE — 99213 PR OFFICE/OUTPT VISIT, EST, LEVL III, 20-29 MIN: ICD-10-PCS | Mod: S$GLB,,, | Performed by: PHYSICIAN ASSISTANT

## 2022-06-27 PROCEDURE — 99999 PR PBB SHADOW E&M-EST. PATIENT-LVL V: CPT | Mod: PBBFAC,,, | Performed by: PHYSICIAN ASSISTANT

## 2022-06-27 PROCEDURE — 3074F PR MOST RECENT SYSTOLIC BLOOD PRESSURE < 130 MM HG: ICD-10-PCS | Mod: CPTII,S$GLB,, | Performed by: PHYSICIAN ASSISTANT

## 2022-06-27 PROCEDURE — 87804 INFLUENZA ASSAY W/OPTIC: CPT | Mod: QW,S$GLB,, | Performed by: PHYSICIAN ASSISTANT

## 2022-06-27 RX ORDER — METHYLPREDNISOLONE 4 MG/1
TABLET ORAL
COMMUNITY
Start: 2022-06-27 | End: 2022-07-12

## 2022-06-27 NOTE — PROGRESS NOTES
Assessment/Plan:    Problem List Items Addressed This Visit        ENT    Viral upper respiratory tract infection - Primary    Overview     -negative for flu, will hold off on treatment until receiving COVID PCR results   -recommend flonase and mucinex prn for symptoms  -supportive care- rest, increase hydration with water, OTC Tylenol/Ibuprofen for pain/fever  -follow up if no improvement in symptoms   -ER precautions for severe or worsening of symptoms            Relevant Orders    POCT Influenza A/B (Completed)          Follow up if symptoms worsen or fail to improve.    Aliyah Schaeffer PA-C  _____________________________________________________________________________________________________________________________________________________    CC: upper respiratory infection     HPI: Patient is in clinic today as an established patient here for upper respiratory infection. This is a new problem. The current episode started 1 day ago. The problem is gradually worsening. Associated symptoms include fever with max temperature of 100.4, chills, diaphoresis, congestion, swollen glands, headache, and body aches. Pertinent negatives include no coughing, ear pain, hoarse voice, neck pain, shortness of breath, sneezing, or sore throat. Past treatments include OTC allergy medication. The treatment provided no relief. She has had no known sick contacts. She is not vaccinated for COVID. Patient has a chronic history of joint pain likely consistent with RA and lupus in which she is followed closely by rheumatology. She reports thinking symptoms were related to a RA/lupus flare. She was recently prescribed a steroid pack from her rheumatologist, but has not started the medication yet. No other complaints today.     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    Arteritis     2015    DDD (degenerative disc disease), cervical     DDD (degenerative disc disease), lumbar     Endometriosis      Heart palpitations     History of lumbar spinal fusion     Pain, pelvic, female     PTSD (post-traumatic stress disorder)     Renal disorder     kidney stones      Past Surgical History:   Procedure Laterality Date    APPENDECTOMY      BILATERAL SALPINGO-OOPHORECTOMY (BSO) Bilateral 10/15/2020    Procedure: SALPINGO-OOPHORECTOMY, BILATERAL;  Surgeon: Zahira Membreno MD;  Location: [facility] OR;  Service: OB/GYN;  Laterality: Bilateral;     SECTION, CLASSIC      x 3    DILATION AND CURETTAGE OF UTERUS      x 2    HYSTERECTOMY  10/15/2016    IMPLANTATION OF PERMANENT SACRAL NERVE STIMULATOR Left     since 2017, Dr Jared Zepeda [facility]     LAPAROSCOPIC SALPINGO-OOPHORECTOMY Bilateral 10/15/2020    Procedure: SALPINGO-OOPHORECTOMY, LAPAROSCOPIC-attempted;  Surgeon: Zahira Membreno MD;  Location: [facility] OR;  Service: OB/GYN;  Laterality: Bilateral;    LUMBAR EPIDURAL INJECTION      multiple times over last eight yrs    LUMBAR FUSION  12/31/15    LYSIS OF ADHESIONS N/A 10/15/2020    Procedure: LYSIS, ADHESIONS;  Surgeon: Zahira Membreno MD;  Location: [facility] OR;  Service: OB/GYN;  Laterality: N/A;    PELVIC LAPAROSCOPY      for endometriosis    RHIZOTOMY W/ RADIOFREQUENCY ABLATION      several ( back and neck ) over past 8 yrs    tonsillectomy      TONSILLECTOMY       Review of patient's allergies indicates:   Allergen Reactions    Adhesive Blisters, Dermatitis, Hives, Itching and Swelling    Imitrex [sumatriptan] Hallucinations    Venom-wasp     Bee venom protein (honey bee)     Eggs [egg derived] Diarrhea     And nausea    Norelgestromin-ethin.estradiol Other (See Comments) and Rash     Chest pains     Social History     Tobacco Use    Smoking status: Never Smoker    Smokeless tobacco: Never Used   Substance Use Topics    Alcohol use: No    Drug use: No     Family History   Problem Relation Age of Onset    Hypertension Mother     Heart disease Mother     Hyperlipidemia Mother      Arthritis Mother     Depression Father     Suicide Father 32    No Known Problems Sister     No Known Problems Brother     No Known Problems Brother      Current Outpatient Medications on File Prior to Visit   Medication Sig Dispense Refill    colchicine (COLCRYS) 0.6 mg tablet Take 1 tablet by mouth 2 (two) times daily.      diclofenac (VOLTAREN) 75 MG EC tablet Take 75 mg by mouth.      estradioL (ESTRACE) 2 MG tablet Take 2 mg by mouth once daily.      fluticasone (FLONASE) 50 mcg/actuation nasal spray as needed.       hydrOXYchloroQUINE (PLAQUENIL) 200 mg tablet Take 1 tablet by mouth 2 (two) times daily.      leflunomide (ARAVA) 20 MG Tab Take 20 mg by mouth.      mometasone (NASONEX) 50 mcg/actuation nasal spray SPRAY TWO sprays IN EACH NOSTRIL DAILY FOR FOURTEEN DAYS      montelukast (SINGULAIR) 10 mg tablet TAKE 1 TABLET BY MOUTH  EVERY EVENING 90 tablet 3    omeprazole (PRILOSEC) 40 MG capsule Take 40 mg by mouth nightly.      predniSONE (DELTASONE) 20 MG tablet Take 20 mg by mouth.      venlafaxine (EFFEXOR-XR) 150 MG Cp24 Take 1 capsule (150 mg total) by mouth once daily. 90 capsule 3    ALPRAZolam (XANAX) 0.5 MG tablet Take 0.5 mg by mouth every 6 (six) hours as needed.      butalbital-acetaminophen-caffeine -40 mg (FIORICET, ESGIC) -40 mg per tablet Take 1 tablet by mouth every 6 (six) hours as needed for Headaches.       EMGALITY  mg/mL PnIj Inject 120 mg into the skin every 28 days  6    EPINEPHrine (EPIPEN) 0.3 mg/0.3 mL AtIn Inject 0.3 mg into the muscle as needed.       gabapentin (NEURONTIN) 300 MG capsule Take 300 mg by mouth 3 (three) times daily.      methylPREDNISolone (MEDROL DOSEPACK) 4 mg tablet follow package directions      tiZANidine (ZANAFLEX) 4 MG tablet Take 1 tablet (4 mg total) by mouth every 8 (eight) hours. (Patient not taking: No sig reported) 60 tablet 3     Current Facility-Administered Medications on File Prior to Visit   Medication  "Dose Route Frequency Provider Last Rate Last Admin    lactated ringers infusion   Intravenous Continuous Zahira Membreno MD   New Bag at 10/15/20 0759    lorazepam injection 0.25 mg  0.25 mg Intravenous Q15 Min PRN Jacqueline Volpi-Abadie, MD   0.25 mg at 10/15/20 0905    ondansetron injection 4 mg  4 mg Intravenous Daily PRN Jacqueline Volpi-Abadie, MD        oxyCODONE-acetaminophen 5-325 mg per tablet 1 tablet  1 tablet Oral Q3H PRN Jacqueline Volpi-Abadie, MD   1 tablet at 10/16/20 0817    sodium chloride 0.9% flush 3 mL  3 mL Intravenous PRN Jacqueline Volpi-Abadie, MD           Review of Systems   Constitutional: Positive for chills, diaphoresis and fever. Negative for fatigue.   HENT: Positive for congestion. Negative for ear pain, postnasal drip, sinus pain and sore throat.    Eyes: Negative for pain and redness.   Respiratory: Negative for cough, chest tightness and shortness of breath.    Cardiovascular: Negative for chest pain and leg swelling.   Gastrointestinal: Negative for abdominal pain, constipation, diarrhea, nausea and vomiting.   Genitourinary: Negative for dysuria and hematuria.   Musculoskeletal: Positive for myalgias. Negative for arthralgias and joint swelling.   Skin: Negative for rash.   Neurological: Positive for headaches. Negative for dizziness and syncope.       Vitals:    06/27/22 1609   BP: 124/83   Pulse: (!) 119   Temp: 100 °F (37.8 °C)   TempSrc: Oral   Weight: 61.8 kg (136 lb 4.8 oz)   Height: 5' 4" (1.626 m)       Wt Readings from Last 3 Encounters:   06/27/22 61.8 kg (136 lb 4.8 oz)   05/16/22 61.1 kg (134 lb 9.6 oz)   07/01/21 59.4 kg (131 lb)       Physical Exam  Constitutional:       General: She is not in acute distress.     Appearance: Normal appearance. She is well-developed.   HENT:      Head: Normocephalic and atraumatic.      Right Ear: Tympanic membrane normal.      Left Ear: Tympanic membrane normal.      Nose: Congestion present.      Mouth/Throat:      Mouth: " Mucous membranes are moist.      Pharynx: Oropharynx is clear.   Eyes:      Conjunctiva/sclera: Conjunctivae normal.   Cardiovascular:      Rate and Rhythm: Normal rate and regular rhythm.      Pulses: Normal pulses.      Heart sounds: Normal heart sounds. No murmur heard.  Pulmonary:      Effort: Pulmonary effort is normal. No respiratory distress.      Breath sounds: Normal breath sounds. No wheezing.   Abdominal:      General: Bowel sounds are normal. There is no distension.      Palpations: Abdomen is soft.      Tenderness: There is no abdominal tenderness.   Musculoskeletal:         General: Normal range of motion.      Cervical back: Normal range of motion and neck supple.   Lymphadenopathy:      Cervical: No cervical adenopathy.   Skin:     General: Skin is warm and dry.      Findings: No rash.   Neurological:      General: No focal deficit present.      Mental Status: She is alert and oriented to person, place, and time.         Health Maintenance   Topic Date Due    Lipid Panel  05/16/2023    TETANUS VACCINE  04/02/2024    Hepatitis C Screening  Completed

## 2022-06-28 ENCOUNTER — PATIENT MESSAGE (OUTPATIENT)
Dept: FAMILY MEDICINE | Facility: CLINIC | Age: 37
End: 2022-06-28
Payer: COMMERCIAL

## 2022-06-28 DIAGNOSIS — J06.9 BACTERIAL UPPER RESPIRATORY INFECTION: Primary | ICD-10-CM

## 2022-06-28 DIAGNOSIS — B96.89 BACTERIAL UPPER RESPIRATORY INFECTION: Primary | ICD-10-CM

## 2022-06-28 LAB
SARS-COV-2 RNA RESP QL NAA+PROBE: NOT DETECTED
SARS-COV-2- CYCLE NUMBER: NORMAL

## 2022-06-28 RX ORDER — AZITHROMYCIN 250 MG/1
TABLET, FILM COATED ORAL
Qty: 6 TABLET | Refills: 0 | Status: SHIPPED | OUTPATIENT
Start: 2022-06-28 | End: 2022-06-29 | Stop reason: SDUPTHER

## 2022-06-28 NOTE — PROGRESS NOTES
Results have been released via [employer]. Please verify that these have been viewed by patient. If not, please call patient with results.     I have sent a message to them with the following interpretation (see below).    I have reviewed your recent COVID test which was negative. It is okay to start the steroid pack as prescribed by your rheumatologist. If symptoms worsen or do not improve, please let me know.    Please do not hesitate to call or message with any additional questions or concerns.    Aliyah Schaeffer PA-C

## 2022-06-28 NOTE — TELEPHONE ENCOUNTER
I have signed for the following orders AND/OR meds.  Please call the patient and ask the patient to schedule the testing AND/OR inform about any medications that were sent. Medications have been sent to pharmacy listed below      No orders of the defined types were placed in this encounter.      Medications Ordered This Encounter   Medications    azithromycin (Z-JOSE) 250 MG tablet     Sig: Take 2 tablets on day 1, then 1 tablet daily on days 2 - 5.     Dispense:  6 tablet     Refill:  0         [facility] - [address] [address]  Phone: [phone] Fax: [phone]    [facility] [place] - [address] [facility] [address]  Phone: [phone] Fax: [phone]

## 2022-06-29 ENCOUNTER — PATIENT MESSAGE (OUTPATIENT)
Dept: FAMILY MEDICINE | Facility: CLINIC | Age: 37
End: 2022-06-29
Payer: COMMERCIAL

## 2022-06-29 DIAGNOSIS — J06.9 BACTERIAL UPPER RESPIRATORY INFECTION: ICD-10-CM

## 2022-06-29 DIAGNOSIS — B96.89 BACTERIAL UPPER RESPIRATORY INFECTION: ICD-10-CM

## 2022-06-29 RX ORDER — AZITHROMYCIN 250 MG/1
TABLET, FILM COATED ORAL
Qty: 6 TABLET | Refills: 0 | Status: SHIPPED | OUTPATIENT
Start: 2022-06-29 | End: 2022-07-12

## 2022-07-06 ENCOUNTER — TELEPHONE (OUTPATIENT)
Dept: NEPHROLOGY | Facility: CLINIC | Age: 37
End: 2022-07-06
Payer: COMMERCIAL

## 2022-07-06 NOTE — TELEPHONE ENCOUNTER
----- Message from Regina Medina sent at 7/6/2022  2:33 PM CDT -----  Contact: self  Patient has an appt on 7/12 and is contacting [facility] to get them to fax over the results of the tests she had done in the er on 7/2/22.  Call patient back if needed at [phone] (home) and thanks

## 2022-07-12 ENCOUNTER — OFFICE VISIT (OUTPATIENT)
Dept: NEPHROLOGY | Facility: CLINIC | Age: 37
End: 2022-07-12
Payer: COMMERCIAL

## 2022-07-12 VITALS
WEIGHT: 136.25 LBS | BODY MASS INDEX: 23.26 KG/M2 | HEART RATE: 108 BPM | SYSTOLIC BLOOD PRESSURE: 138 MMHG | DIASTOLIC BLOOD PRESSURE: 80 MMHG | HEIGHT: 64 IN | OXYGEN SATURATION: 98 %

## 2022-07-12 DIAGNOSIS — M32.8 OTHER FORMS OF SYSTEMIC LUPUS ERYTHEMATOSUS, UNSPECIFIED ORGAN INVOLVEMENT STATUS: Primary | ICD-10-CM

## 2022-07-12 DIAGNOSIS — E83.59 NEPHROCALCINOSIS: ICD-10-CM

## 2022-07-12 DIAGNOSIS — N29 NEPHROCALCINOSIS: ICD-10-CM

## 2022-07-12 PROCEDURE — 99204 PR OFFICE/OUTPT VISIT, NEW, LEVL IV, 45-59 MIN: ICD-10-PCS | Mod: S$GLB,,, | Performed by: INTERNAL MEDICINE

## 2022-07-12 PROCEDURE — 99999 PR PBB SHADOW E&M-EST. PATIENT-LVL IV: ICD-10-PCS | Mod: PBBFAC,,, | Performed by: INTERNAL MEDICINE

## 2022-07-12 PROCEDURE — 99204 OFFICE O/P NEW MOD 45 MIN: CPT | Mod: S$GLB,,, | Performed by: INTERNAL MEDICINE

## 2022-07-12 PROCEDURE — 99999 PR PBB SHADOW E&M-EST. PATIENT-LVL IV: CPT | Mod: PBBFAC,,, | Performed by: INTERNAL MEDICINE

## 2022-07-12 RX ORDER — CEFDINIR 300 MG/1
300 CAPSULE ORAL 2 TIMES DAILY
COMMUNITY
End: 2023-09-08

## 2022-07-12 NOTE — PROGRESS NOTES
"Subjective:       Patient ID: Ludy Carney is a 37 y.o. White female who presents for initial evaluation of nephrolithiasis, self referral.     Renal function per chart review with sr cr baseline of 0.7 - 0.8 mg/dL and no proteinuria.    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      Review of Systems   Constitutional: Positive for chills. Negative for fatigue and fever.   HENT: Negative for hearing loss, trouble swallowing and voice change.    Respiratory: Negative for cough, shortness of breath and wheezing.    Cardiovascular: Negative for chest pain, palpitations and leg swelling.   Gastrointestinal: Negative for abdominal distention, abdominal pain, constipation and diarrhea.   Endocrine: Negative for polydipsia, polyphagia and polyuria.   Genitourinary: Positive for flank pain. Negative for dysuria, frequency and hematuria.   Musculoskeletal: Negative for arthralgias, back pain and myalgias.   Skin: Negative for rash and wound.   Neurological: Negative for dizziness, syncope, weakness and light-headedness.   Hematological: Negative for adenopathy. Does not bruise/bleed easily.       The past medical, family and social histories were reviewed for this encounter.     /80 (BP Location: Right arm, Patient Position: Sitting, BP Method: Medium (Manual))   Pulse 108   Ht 5' 4" (1.626 m)   Wt 61.8 kg (136 lb 3.9 oz)   LMP 10/05/2016   SpO2 98%   BMI 23.39 kg/m²     Objective:      Physical Exam  Vitals and nursing note reviewed.   Constitutional:       Appearance: Normal appearance.   HENT:      Head: Normocephalic and atraumatic.      Mouth/Throat:      Mouth: Mucous membranes are moist.      Pharynx: Oropharynx is clear.   Eyes:      " Extraocular Movements: Extraocular movements intact.      Conjunctiva/sclera: Conjunctivae normal.   Neck:      Vascular: No carotid bruit.   Cardiovascular:      Rate and Rhythm: Normal rate and regular rhythm.      Heart sounds: Normal heart sounds.   Pulmonary:      Effort: Pulmonary effort is normal. No respiratory distress.      Breath sounds: Normal breath sounds.   Abdominal:      General: Bowel sounds are normal. There is no distension.      Palpations: Abdomen is soft.      Tenderness: There is no abdominal tenderness.   Musculoskeletal:         General: No swelling or tenderness. Normal range of motion.      Cervical back: Normal range of motion. No tenderness.   Lymphadenopathy:      Cervical: No cervical adenopathy.   Skin:     General: Skin is warm and dry.      Capillary Refill: Capillary refill takes less than 2 seconds.      Coloration: Skin is not jaundiced.   Neurological:      General: No focal deficit present.      Mental Status: She is alert and oriented to person, place, and time.   Psychiatric:         Mood and Affect: Mood normal.         Behavior: Behavior normal.         Judgment: Judgment normal.         Assessment:       1. Other forms of systemic lupus erythematosus, unspecified organ involvement status    2. Nephrocalcinosis        Plan:   Return to clinic in 1 months    Baseline creatinine is 0.8 mg/dL  Lab Results   Component Value Date    CREATININE 0.7 05/16/2022     Lupus    - On Voltaren   - No signs of nephritis    Nephrocalcinosis   - 24 hour urine collection     Med changes: none

## 2022-07-18 ENCOUNTER — LAB VISIT (OUTPATIENT)
Dept: LAB | Facility: HOSPITAL | Age: 37
End: 2022-07-18
Attending: INTERNAL MEDICINE
Payer: COMMERCIAL

## 2022-07-18 DIAGNOSIS — N29 NEPHROCALCINOSIS: ICD-10-CM

## 2022-07-18 DIAGNOSIS — E83.59 NEPHROCALCINOSIS: ICD-10-CM

## 2022-07-18 PROCEDURE — 84392 ASSAY OF URINE SULFATE: CPT | Performed by: INTERNAL MEDICINE

## 2022-07-21 LAB
AMMONIA 24H UR-SRATE: 24 MMOL/24 H (ref 15–56)
BSA: ABNORMAL M2
CA H2 PHOS DIHYD 24H SATFR UR: 0.57 DG
CALCIUM 24H UR-MRATE: 192 MG/24 H
CAOX 24H ENGDIFF UR: 1.21 DG
CHLORIDE 24H UR-SRATE: 247 MMOL/24 H
CITRATE 24H UR-MRATE: 713 MG/24 H (ref 285–1191)
CLINICAL BIOCHEMIST REVIEW: ABNORMAL
COLLECT DURATION TIME UR: 24 H
CREAT 24H UR-MRATE: 1368 MG/24 H (ref 603–1783)
HYDROXYAPATITE 24H ENGDIFF UR: 6.36 DG
MAGNESIUM 24H UR-MRATE: 48 MG/24 H (ref 51–269)
NA URATE 24H SATFR UR: 1.91 DG
OSMOLALITY 24H UR: 420 MOSM/KG (ref 150–1150)
OXALATE 24H UR-MRATE: 31.7 MG/24 H (ref 9.7–40.5)
OXALATE 24H UR-SRATE: 0.36 MMOL/24 H (ref 0.11–0.46)
PH 24H UR: 6.9 [PH] (ref 4.5–8)
PHOSPHATE 24H UR-MRATE: 888 MG/24 H (ref 226–1797)
POTASSIUM 24H UR-SRATE: 67 MMOL/24 H (ref 16–105)
PROTEIN CATABOLIC RATE, URINE: 72 G/24 H (ref 56–125)
SODIUM 24H UR-SRATE: 307 MMOL/24 H (ref 22–328)
SPECIMEN VOL 24H UR: 2400 ML
SULFATE 24H UR-SRATE: 14 MMOL/24 H (ref 7–47)
URATE 24H SATFR UR: -4.45 DG
URATE 24H UR-MRATE: 744 MG/24 H (ref 250–750)
UUN 24H UR-MRATE: 7.5 G/24 H (ref 7–42)

## 2022-07-28 ENCOUNTER — HOSPITAL ENCOUNTER (OUTPATIENT)
Dept: RADIOLOGY | Facility: HOSPITAL | Age: 37
Discharge: HOME | End: 2022-07-28
Attending: INTERNAL MEDICINE
Payer: COMMERCIAL

## 2022-07-28 DIAGNOSIS — E83.59 NEPHROCALCINOSIS: ICD-10-CM

## 2022-07-28 DIAGNOSIS — N29 NEPHROCALCINOSIS: ICD-10-CM

## 2022-07-28 PROCEDURE — 76770 US EXAM ABDO BACK WALL COMP: CPT | Mod: TC,PO

## 2022-07-28 PROCEDURE — 76770 US RETROPERITONEAL COMPLETE: ICD-10-PCS | Mod: 26,,, | Performed by: RADIOLOGY

## 2022-07-28 PROCEDURE — 76770 US EXAM ABDO BACK WALL COMP: CPT | Mod: 26,,, | Performed by: RADIOLOGY

## 2022-08-08 ENCOUNTER — OFFICE VISIT (OUTPATIENT)
Dept: NEPHROLOGY | Facility: CLINIC | Age: 37
End: 2022-08-08
Payer: COMMERCIAL

## 2022-08-08 VITALS
HEIGHT: 64 IN | DIASTOLIC BLOOD PRESSURE: 88 MMHG | SYSTOLIC BLOOD PRESSURE: 160 MMHG | BODY MASS INDEX: 23.26 KG/M2 | HEART RATE: 101 BPM | OXYGEN SATURATION: 98 % | WEIGHT: 136.25 LBS

## 2022-08-08 DIAGNOSIS — I10 PRIMARY HYPERTENSION: ICD-10-CM

## 2022-08-08 DIAGNOSIS — M32.8 OTHER FORMS OF SYSTEMIC LUPUS ERYTHEMATOSUS, UNSPECIFIED ORGAN INVOLVEMENT STATUS: ICD-10-CM

## 2022-08-08 DIAGNOSIS — M50.30 DDD (DEGENERATIVE DISC DISEASE), CERVICAL: Primary | ICD-10-CM

## 2022-08-08 DIAGNOSIS — E83.59 NEPHROCALCINOSIS: ICD-10-CM

## 2022-08-08 DIAGNOSIS — N29 NEPHROCALCINOSIS: ICD-10-CM

## 2022-08-08 PROCEDURE — 3079F PR MOST RECENT DIASTOLIC BLOOD PRESSURE 80-89 MM HG: ICD-10-PCS | Mod: CPTII,S$GLB,, | Performed by: INTERNAL MEDICINE

## 2022-08-08 PROCEDURE — 99999 PR PBB SHADOW E&M-EST. PATIENT-LVL IV: ICD-10-PCS | Mod: PBBFAC,,, | Performed by: INTERNAL MEDICINE

## 2022-08-08 PROCEDURE — 3077F PR MOST RECENT SYSTOLIC BLOOD PRESSURE >= 140 MM HG: ICD-10-PCS | Mod: CPTII,S$GLB,, | Performed by: INTERNAL MEDICINE

## 2022-08-08 PROCEDURE — 1159F MED LIST DOCD IN RCRD: CPT | Mod: CPTII,S$GLB,, | Performed by: INTERNAL MEDICINE

## 2022-08-08 PROCEDURE — 99214 OFFICE O/P EST MOD 30 MIN: CPT | Mod: S$GLB,,, | Performed by: INTERNAL MEDICINE

## 2022-08-08 PROCEDURE — 3079F DIAST BP 80-89 MM HG: CPT | Mod: CPTII,S$GLB,, | Performed by: INTERNAL MEDICINE

## 2022-08-08 PROCEDURE — 3077F SYST BP >= 140 MM HG: CPT | Mod: CPTII,S$GLB,, | Performed by: INTERNAL MEDICINE

## 2022-08-08 PROCEDURE — 99999 PR PBB SHADOW E&M-EST. PATIENT-LVL IV: CPT | Mod: PBBFAC,,, | Performed by: INTERNAL MEDICINE

## 2022-08-08 PROCEDURE — 1160F RVW MEDS BY RX/DR IN RCRD: CPT | Mod: CPTII,S$GLB,, | Performed by: INTERNAL MEDICINE

## 2022-08-08 PROCEDURE — 1160F PR REVIEW ALL MEDS BY PRESCRIBER/CLIN PHARMACIST DOCUMENTED: ICD-10-PCS | Mod: CPTII,S$GLB,, | Performed by: INTERNAL MEDICINE

## 2022-08-08 PROCEDURE — 3008F PR BODY MASS INDEX (BMI) DOCUMENTED: ICD-10-PCS | Mod: CPTII,S$GLB,, | Performed by: INTERNAL MEDICINE

## 2022-08-08 PROCEDURE — 1159F PR MEDICATION LIST DOCUMENTED IN MEDICAL RECORD: ICD-10-PCS | Mod: CPTII,S$GLB,, | Performed by: INTERNAL MEDICINE

## 2022-08-08 PROCEDURE — 3008F BODY MASS INDEX DOCD: CPT | Mod: CPTII,S$GLB,, | Performed by: INTERNAL MEDICINE

## 2022-08-08 PROCEDURE — 99214 PR OFFICE/OUTPT VISIT, EST, LEVL IV, 30-39 MIN: ICD-10-PCS | Mod: S$GLB,,, | Performed by: INTERNAL MEDICINE

## 2022-08-08 PROCEDURE — 3066F PR DOCUMENTATION OF TREATMENT FOR NEPHROPATHY: ICD-10-PCS | Mod: CPTII,S$GLB,, | Performed by: INTERNAL MEDICINE

## 2022-08-08 PROCEDURE — 3066F NEPHROPATHY DOC TX: CPT | Mod: CPTII,S$GLB,, | Performed by: INTERNAL MEDICINE

## 2022-08-08 RX ORDER — CHLORTHALIDONE 25 MG/1
12.5 TABLET ORAL DAILY
Qty: 15 TABLET | Refills: 11 | Status: SHIPPED | OUTPATIENT
Start: 2022-08-08 | End: 2022-10-17

## 2022-08-08 RX ORDER — LANOLIN ALCOHOL/MO/W.PET/CERES
400 CREAM (GRAM) TOPICAL DAILY
Qty: 90 TABLET | Refills: 3 | Status: SHIPPED | OUTPATIENT
Start: 2022-08-08 | End: 2022-10-17

## 2022-08-08 NOTE — PROGRESS NOTES
"Subjective:       Patient ID: Ludy Carney is a 37 y.o. White female who presents for follow up on Nephrocalcinosis.     Since last seen at nephrology clinic, Ludy Carney denies any new hospitalizations or new medications.  No uremic symptoms, not volume overloaded.    Reports taking their medications on time, without missed doses. As to their chronic conditions:  - Nephrocalcinosis due to Ca hypoxyapetite stones, last 24 hour urine showed   - Good Fluid intake    - Increased Sodium intake   - Moderate control of urinary Ca   - Low Mg   - Acceptable pH and Citrate   - Increased risk for stone formation due to above    Review of Systems   Constitutional: Negative for appetite change, chills and fever.   HENT: Negative for congestion.    Eyes: Negative for visual disturbance.   Respiratory: Negative for cough and shortness of breath.    Cardiovascular: Negative for chest pain and leg swelling.   Gastrointestinal: Negative for abdominal pain, diarrhea, nausea and vomiting.   Genitourinary: Negative for difficulty urinating, dysuria and hematuria.   Musculoskeletal: Negative for myalgias.   Skin: Negative for rash.   Neurological: Negative for headaches.   Psychiatric/Behavioral: Negative for sleep disturbance.       The past medical, family and social histories were reviewed for this encounter.     BP (!) 160/88 (BP Location: Right arm, Patient Position: Sitting, BP Method: Medium (Manual))   Pulse 101   Ht 5' 4" (1.626 m)   Wt 61.8 kg (136 lb 3.9 oz)   LMP 10/05/2016   SpO2 98%   BMI 23.39 kg/m²     Objective:      Physical Exam  Vitals and nursing note reviewed.   Constitutional:       Appearance: Normal appearance.   HENT:      Head: Normocephalic and atraumatic.      Mouth/Throat:      Mouth: Mucous membranes are moist.      Pharynx: Oropharynx is clear.   Eyes:      Extraocular Movements: Extraocular movements intact.      Conjunctiva/sclera: Conjunctivae normal.   Neck:      " Vascular: No carotid bruit.   Cardiovascular:      Rate and Rhythm: Normal rate and regular rhythm.      Heart sounds: Normal heart sounds.   Pulmonary:      Effort: Pulmonary effort is normal. No respiratory distress.      Breath sounds: Normal breath sounds.   Abdominal:      General: Bowel sounds are normal. There is no distension.      Palpations: Abdomen is soft.      Tenderness: There is no abdominal tenderness.   Musculoskeletal:         General: No swelling or tenderness. Normal range of motion.      Cervical back: Normal range of motion. No tenderness.   Lymphadenopathy:      Cervical: No cervical adenopathy.   Skin:     General: Skin is warm and dry.      Capillary Refill: Capillary refill takes less than 2 seconds.      Coloration: Skin is not jaundiced.   Neurological:      General: No focal deficit present.      Mental Status: She is alert and oriented to person, place, and time.   Psychiatric:         Mood and Affect: Mood normal.         Behavior: Behavior normal.         Judgment: Judgment normal.         Assessment:       1. DDD (degenerative disc disease), cervical    2. Other forms of systemic lupus erythematosus, unspecified organ involvement status    3. Nephrocalcinosis        Plan:   Return to clinic in 2 months    Baseline creatinine is 0.8 mg/dL and no proteinuria  Lab Results   Component Value Date    CREATININE 0.7 05/16/2022      - Increase water consumption to reach 2.5-3L of urine per day   - Decrease salt intake 4 fold   - Start Mg 400 mg PO Daily   - Start Chlorthalidone 12.5 mg PO Daily    HTN   - Start diuretic    Lupus    - Q6 mo UAs    DDD   - No NSAIDs    Med changes: Start chlorthalidone 12.5 mg PO Daily

## 2022-10-17 ENCOUNTER — OFFICE VISIT (OUTPATIENT)
Dept: NEPHROLOGY | Facility: CLINIC | Age: 37
End: 2022-10-17
Payer: COMMERCIAL

## 2022-10-17 VITALS
HEART RATE: 84 BPM | WEIGHT: 136.25 LBS | HEIGHT: 64 IN | DIASTOLIC BLOOD PRESSURE: 78 MMHG | OXYGEN SATURATION: 99 % | BODY MASS INDEX: 23.26 KG/M2 | SYSTOLIC BLOOD PRESSURE: 126 MMHG

## 2022-10-17 DIAGNOSIS — M32.8 OTHER FORMS OF SYSTEMIC LUPUS ERYTHEMATOSUS, UNSPECIFIED ORGAN INVOLVEMENT STATUS: ICD-10-CM

## 2022-10-17 DIAGNOSIS — N29 NEPHROCALCINOSIS: ICD-10-CM

## 2022-10-17 DIAGNOSIS — M50.30 DDD (DEGENERATIVE DISC DISEASE), CERVICAL: Primary | ICD-10-CM

## 2022-10-17 DIAGNOSIS — I10 PRIMARY HYPERTENSION: ICD-10-CM

## 2022-10-17 DIAGNOSIS — E83.59 NEPHROCALCINOSIS: ICD-10-CM

## 2022-10-17 PROCEDURE — 1160F RVW MEDS BY RX/DR IN RCRD: CPT | Mod: CPTII,S$GLB,, | Performed by: INTERNAL MEDICINE

## 2022-10-17 PROCEDURE — 3066F NEPHROPATHY DOC TX: CPT | Mod: CPTII,S$GLB,, | Performed by: INTERNAL MEDICINE

## 2022-10-17 PROCEDURE — 99214 OFFICE O/P EST MOD 30 MIN: CPT | Mod: S$GLB,,, | Performed by: INTERNAL MEDICINE

## 2022-10-17 PROCEDURE — 3074F SYST BP LT 130 MM HG: CPT | Mod: CPTII,S$GLB,, | Performed by: INTERNAL MEDICINE

## 2022-10-17 PROCEDURE — 1159F PR MEDICATION LIST DOCUMENTED IN MEDICAL RECORD: ICD-10-PCS | Mod: CPTII,S$GLB,, | Performed by: INTERNAL MEDICINE

## 2022-10-17 PROCEDURE — 3074F PR MOST RECENT SYSTOLIC BLOOD PRESSURE < 130 MM HG: ICD-10-PCS | Mod: CPTII,S$GLB,, | Performed by: INTERNAL MEDICINE

## 2022-10-17 PROCEDURE — 1159F MED LIST DOCD IN RCRD: CPT | Mod: CPTII,S$GLB,, | Performed by: INTERNAL MEDICINE

## 2022-10-17 PROCEDURE — 99214 PR OFFICE/OUTPT VISIT, EST, LEVL IV, 30-39 MIN: ICD-10-PCS | Mod: S$GLB,,, | Performed by: INTERNAL MEDICINE

## 2022-10-17 PROCEDURE — 3078F DIAST BP <80 MM HG: CPT | Mod: CPTII,S$GLB,, | Performed by: INTERNAL MEDICINE

## 2022-10-17 PROCEDURE — 3078F PR MOST RECENT DIASTOLIC BLOOD PRESSURE < 80 MM HG: ICD-10-PCS | Mod: CPTII,S$GLB,, | Performed by: INTERNAL MEDICINE

## 2022-10-17 PROCEDURE — 1160F PR REVIEW ALL MEDS BY PRESCRIBER/CLIN PHARMACIST DOCUMENTED: ICD-10-PCS | Mod: CPTII,S$GLB,, | Performed by: INTERNAL MEDICINE

## 2022-10-17 PROCEDURE — 3066F PR DOCUMENTATION OF TREATMENT FOR NEPHROPATHY: ICD-10-PCS | Mod: CPTII,S$GLB,, | Performed by: INTERNAL MEDICINE

## 2022-10-17 PROCEDURE — 99999 PR PBB SHADOW E&M-EST. PATIENT-LVL III: CPT | Mod: PBBFAC,,, | Performed by: INTERNAL MEDICINE

## 2022-10-17 PROCEDURE — 99999 PR PBB SHADOW E&M-EST. PATIENT-LVL III: ICD-10-PCS | Mod: PBBFAC,,, | Performed by: INTERNAL MEDICINE

## 2022-10-17 RX ORDER — CHLORTHALIDONE 25 MG/1
25 TABLET ORAL DAILY
Qty: 90 TABLET | Refills: 3 | Status: SHIPPED | OUTPATIENT
Start: 2022-10-17 | End: 2023-04-21 | Stop reason: SDUPTHER

## 2022-10-17 NOTE — PROGRESS NOTES
"Subjective:       Patient ID: Ludy Carney is a 37 y.o. White female who presents for follow up on Nephrocalcinosis.     Since last seen at nephrology clinic, Ludy Carney denies any new hospitalizations or new medications. Did not complete 24 hour urine.  No uremic symptoms, not volume overloaded.    Reports taking their medications on time, without missed doses. As to their chronic conditions:  - Nephrocalcinosis due to Ca hypoxyapetite stones, last 24 hour urine showed  - Lupus without renal manifestation      Review of Systems   Constitutional:  Negative for appetite change, chills and fever.   HENT:  Negative for congestion.    Eyes:  Negative for visual disturbance.   Respiratory:  Negative for cough and shortness of breath.    Cardiovascular:  Negative for chest pain and leg swelling.   Gastrointestinal:  Negative for abdominal pain, diarrhea, nausea and vomiting.   Genitourinary:  Negative for difficulty urinating, dysuria and hematuria.   Musculoskeletal:  Negative for myalgias.   Skin:  Negative for rash.   Neurological:  Negative for headaches.   Hematological:  Bruises/bleeds easily.   Psychiatric/Behavioral:  Negative for sleep disturbance.      The past medical, family and social histories were reviewed for this encounter.     /78 (BP Location: Right arm, Patient Position: Sitting, BP Method: Medium (Manual))   Pulse 84   Ht 5' 4" (1.626 m)   Wt 61.8 kg (136 lb 3.9 oz)   LMP 10/05/2016   SpO2 99%   BMI 23.39 kg/m²     Objective:      Physical Exam  Vitals and nursing note reviewed.   Constitutional:       Appearance: Normal appearance.   HENT:      Head: Normocephalic and atraumatic.      Mouth/Throat:      Mouth: Mucous membranes are moist.      Pharynx: Oropharynx is clear.   Eyes:      Extraocular Movements: Extraocular movements intact.      Conjunctiva/sclera: Conjunctivae normal.   Neck:      Vascular: No carotid bruit.   Cardiovascular:      Rate and " Rhythm: Normal rate and regular rhythm.      Heart sounds: Normal heart sounds.   Pulmonary:      Effort: Pulmonary effort is normal. No respiratory distress.      Breath sounds: Normal breath sounds.   Abdominal:      General: Bowel sounds are normal. There is no distension.      Palpations: Abdomen is soft.      Tenderness: There is no abdominal tenderness.   Musculoskeletal:         General: No swelling or tenderness. Normal range of motion.      Cervical back: Normal range of motion. No tenderness.   Lymphadenopathy:      Cervical: No cervical adenopathy.   Skin:     General: Skin is warm and dry.      Capillary Refill: Capillary refill takes less than 2 seconds.      Coloration: Skin is not jaundiced.   Neurological:      General: No focal deficit present.      Mental Status: She is alert and oriented to person, place, and time.   Psychiatric:         Mood and Affect: Mood normal.         Behavior: Behavior normal.         Judgment: Judgment normal.       Assessment:       1. DDD (degenerative disc disease), cervical    2. Primary hypertension    3. Other forms of systemic lupus erythematosus, unspecified organ involvement status    4. Nephrocalcinosis          Plan:   Return to clinic in 6 months    Baseline creatinine is 0.8 mg/dL and no proteinuria  Lab Results   Component Value Date    CREATININE 0.7 05/16/2022     Nephrolithiasis   - 24 hour urine in 2 weeks    HTN   - Increase diuretic    Lupus    - Q6 mo UAs    DDD   - No NSAIDs    Med changes: Increase chlorthalidone 25 mg PO Daily

## 2023-03-29 DIAGNOSIS — Z12.31 OTHER SCREENING MAMMOGRAM: ICD-10-CM

## 2023-04-06 ENCOUNTER — OFFICE VISIT (OUTPATIENT)
Dept: FAMILY MEDICINE | Facility: CLINIC | Age: 38
End: 2023-04-06
Payer: COMMERCIAL

## 2023-04-06 ENCOUNTER — LAB VISIT (OUTPATIENT)
Dept: LAB | Facility: HOSPITAL | Age: 38
End: 2023-04-06
Attending: NURSE PRACTITIONER
Payer: COMMERCIAL

## 2023-04-06 VITALS
DIASTOLIC BLOOD PRESSURE: 85 MMHG | WEIGHT: 127.13 LBS | BODY MASS INDEX: 22.53 KG/M2 | OXYGEN SATURATION: 100 % | RESPIRATION RATE: 18 BRPM | HEIGHT: 63 IN | SYSTOLIC BLOOD PRESSURE: 122 MMHG | HEART RATE: 87 BPM

## 2023-04-06 DIAGNOSIS — I10 PRIMARY HYPERTENSION: ICD-10-CM

## 2023-04-06 DIAGNOSIS — Z00.00 ANNUAL PHYSICAL EXAM: Primary | ICD-10-CM

## 2023-04-06 DIAGNOSIS — M25.50 ARTHRALGIA, UNSPECIFIED JOINT: ICD-10-CM

## 2023-04-06 DIAGNOSIS — F32.A ANXIETY AND DEPRESSION: ICD-10-CM

## 2023-04-06 DIAGNOSIS — R00.2 PALPITATIONS: ICD-10-CM

## 2023-04-06 DIAGNOSIS — I77.6 AORTITIS: ICD-10-CM

## 2023-04-06 DIAGNOSIS — N29 NEPHROCALCINOSIS: ICD-10-CM

## 2023-04-06 DIAGNOSIS — F43.10 PTSD (POST-TRAUMATIC STRESS DISORDER): ICD-10-CM

## 2023-04-06 DIAGNOSIS — Z00.00 ANNUAL PHYSICAL EXAM: ICD-10-CM

## 2023-04-06 DIAGNOSIS — E83.59 NEPHROCALCINOSIS: ICD-10-CM

## 2023-04-06 DIAGNOSIS — F41.9 ANXIETY AND DEPRESSION: ICD-10-CM

## 2023-04-06 DIAGNOSIS — M32.8 OTHER FORMS OF SYSTEMIC LUPUS ERYTHEMATOSUS, UNSPECIFIED ORGAN INVOLVEMENT STATUS: ICD-10-CM

## 2023-04-06 LAB
ALBUMIN SERPL BCP-MCNC: 4.8 G/DL (ref 3.5–5.2)
ALP SERPL-CCNC: 62 U/L (ref 55–135)
ALT SERPL W/O P-5'-P-CCNC: 17 U/L (ref 10–44)
ANION GAP SERPL CALC-SCNC: 17 MMOL/L (ref 8–16)
AST SERPL-CCNC: 19 U/L (ref 10–40)
BILIRUB SERPL-MCNC: 0.5 MG/DL (ref 0.1–1)
BUN SERPL-MCNC: 12 MG/DL (ref 6–20)
CALCIUM SERPL-MCNC: 10.7 MG/DL (ref 8.7–10.5)
CHLORIDE SERPL-SCNC: 94 MMOL/L (ref 95–110)
CHOLEST SERPL-MCNC: 278 MG/DL (ref 120–199)
CHOLEST/HDLC SERPL: 4.3 {RATIO} (ref 2–5)
CO2 SERPL-SCNC: 27 MMOL/L (ref 23–29)
CREAT SERPL-MCNC: 0.7 MG/DL (ref 0.5–1.4)
EST. GFR  (NO RACE VARIABLE): >60 ML/MIN/1.73 M^2
GLUCOSE SERPL-MCNC: 84 MG/DL (ref 70–110)
HDLC SERPL-MCNC: 65 MG/DL (ref 40–75)
HDLC SERPL: 23.4 % (ref 20–50)
IRON SERPL-MCNC: 93 UG/DL (ref 30–160)
LDLC SERPL CALC-MCNC: 171.8 MG/DL (ref 63–159)
NONHDLC SERPL-MCNC: 213 MG/DL
POTASSIUM SERPL-SCNC: 3.6 MMOL/L (ref 3.5–5.1)
PROT SERPL-MCNC: 8.2 G/DL (ref 6–8.4)
SATURATED IRON: 18 % (ref 20–50)
SODIUM SERPL-SCNC: 138 MMOL/L (ref 136–145)
TOTAL IRON BINDING CAPACITY: 525 UG/DL (ref 250–450)
TRANSFERRIN SERPL-MCNC: 355 MG/DL (ref 200–375)
TRIGL SERPL-MCNC: 206 MG/DL (ref 30–150)
URATE SERPL-MCNC: 4.5 MG/DL (ref 2.4–5.7)

## 2023-04-06 PROCEDURE — 1159F MED LIST DOCD IN RCRD: CPT | Mod: CPTII,S$GLB,, | Performed by: NURSE PRACTITIONER

## 2023-04-06 PROCEDURE — 36415 COLL VENOUS BLD VENIPUNCTURE: CPT | Mod: PO | Performed by: NURSE PRACTITIONER

## 2023-04-06 PROCEDURE — 85027 COMPLETE CBC AUTOMATED: CPT | Performed by: NURSE PRACTITIONER

## 2023-04-06 PROCEDURE — 84550 ASSAY OF BLOOD/URIC ACID: CPT | Performed by: NURSE PRACTITIONER

## 2023-04-06 PROCEDURE — 84443 ASSAY THYROID STIM HORMONE: CPT | Performed by: NURSE PRACTITIONER

## 2023-04-06 PROCEDURE — 99396 PR PREVENTIVE VISIT,EST,40-64: ICD-10-PCS | Mod: S$GLB,,, | Performed by: NURSE PRACTITIONER

## 2023-04-06 PROCEDURE — 82306 VITAMIN D 25 HYDROXY: CPT | Performed by: NURSE PRACTITIONER

## 2023-04-06 PROCEDURE — 3008F BODY MASS INDEX DOCD: CPT | Mod: CPTII,S$GLB,, | Performed by: NURSE PRACTITIONER

## 2023-04-06 PROCEDURE — 80053 COMPREHEN METABOLIC PANEL: CPT | Performed by: NURSE PRACTITIONER

## 2023-04-06 PROCEDURE — 99396 PREV VISIT EST AGE 40-64: CPT | Mod: S$GLB,,, | Performed by: NURSE PRACTITIONER

## 2023-04-06 PROCEDURE — 93010 EKG 12-LEAD: ICD-10-PCS | Mod: S$GLB,,, | Performed by: INTERNAL MEDICINE

## 2023-04-06 PROCEDURE — 3008F PR BODY MASS INDEX (BMI) DOCUMENTED: ICD-10-PCS | Mod: CPTII,S$GLB,, | Performed by: NURSE PRACTITIONER

## 2023-04-06 PROCEDURE — 84466 ASSAY OF TRANSFERRIN: CPT | Performed by: NURSE PRACTITIONER

## 2023-04-06 PROCEDURE — 3074F SYST BP LT 130 MM HG: CPT | Mod: CPTII,S$GLB,, | Performed by: NURSE PRACTITIONER

## 2023-04-06 PROCEDURE — 82728 ASSAY OF FERRITIN: CPT | Performed by: NURSE PRACTITIONER

## 2023-04-06 PROCEDURE — 93010 ELECTROCARDIOGRAM REPORT: CPT | Mod: S$GLB,,, | Performed by: INTERNAL MEDICINE

## 2023-04-06 PROCEDURE — 82607 VITAMIN B-12: CPT | Performed by: NURSE PRACTITIONER

## 2023-04-06 PROCEDURE — 99999 PR PBB SHADOW E&M-EST. PATIENT-LVL V: ICD-10-PCS | Mod: PBBFAC,,, | Performed by: NURSE PRACTITIONER

## 2023-04-06 PROCEDURE — 1160F PR REVIEW ALL MEDS BY PRESCRIBER/CLIN PHARMACIST DOCUMENTED: ICD-10-PCS | Mod: CPTII,S$GLB,, | Performed by: NURSE PRACTITIONER

## 2023-04-06 PROCEDURE — 93005 ELECTROCARDIOGRAM TRACING: CPT | Mod: S$GLB,,, | Performed by: NURSE PRACTITIONER

## 2023-04-06 PROCEDURE — 80061 LIPID PANEL: CPT | Performed by: NURSE PRACTITIONER

## 2023-04-06 PROCEDURE — 3079F DIAST BP 80-89 MM HG: CPT | Mod: CPTII,S$GLB,, | Performed by: NURSE PRACTITIONER

## 2023-04-06 PROCEDURE — 93005 EKG 12-LEAD: ICD-10-PCS | Mod: S$GLB,,, | Performed by: NURSE PRACTITIONER

## 2023-04-06 PROCEDURE — 1160F RVW MEDS BY RX/DR IN RCRD: CPT | Mod: CPTII,S$GLB,, | Performed by: NURSE PRACTITIONER

## 2023-04-06 PROCEDURE — 99999 PR PBB SHADOW E&M-EST. PATIENT-LVL V: CPT | Mod: PBBFAC,,, | Performed by: NURSE PRACTITIONER

## 2023-04-06 PROCEDURE — 3079F PR MOST RECENT DIASTOLIC BLOOD PRESSURE 80-89 MM HG: ICD-10-PCS | Mod: CPTII,S$GLB,, | Performed by: NURSE PRACTITIONER

## 2023-04-06 PROCEDURE — 3074F PR MOST RECENT SYSTOLIC BLOOD PRESSURE < 130 MM HG: ICD-10-PCS | Mod: CPTII,S$GLB,, | Performed by: NURSE PRACTITIONER

## 2023-04-06 PROCEDURE — 1159F PR MEDICATION LIST DOCUMENTED IN MEDICAL RECORD: ICD-10-PCS | Mod: CPTII,S$GLB,, | Performed by: NURSE PRACTITIONER

## 2023-04-06 RX ORDER — METHYLPREDNISOLONE 4 MG/1
TABLET ORAL
COMMUNITY
Start: 2022-11-28 | End: 2023-04-06

## 2023-04-06 RX ORDER — CLOBETASOL PROPIONATE 0.46 MG/ML
SOLUTION TOPICAL
COMMUNITY
Start: 2022-11-16

## 2023-04-06 RX ORDER — LANOLIN ALCOHOL/MO/W.PET/CERES
400 CREAM (GRAM) TOPICAL
COMMUNITY
Start: 2022-10-17

## 2023-04-06 RX ORDER — NABUMETONE 750 MG/1
750 TABLET, FILM COATED ORAL 2 TIMES DAILY
COMMUNITY
Start: 2023-03-01

## 2023-04-06 RX ORDER — MYCOPHENOLATE MOFETIL 500 MG/1
1000 TABLET ORAL 2 TIMES DAILY
COMMUNITY
Start: 2023-03-01

## 2023-04-06 RX ORDER — KETOCONAZOLE 20 MG/ML
SHAMPOO, SUSPENSION TOPICAL
COMMUNITY
Start: 2022-11-16

## 2023-04-06 NOTE — PROGRESS NOTES
"Subjective     Patient ID: Ludy Carney is a 38 y.o. female.    Chief Complaint: Annual Exam  Pt in today for annual exam. Pt currently seeing rheumatology for lupus management; states, "he's been changing my medicines but nothing is working. I just don't feel right. It feels like I'm hurting in my bones. 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 I know I'm in a-fib. I have palpitations. I just know something is not right." Pt states having no palpitations at present. Pt seeing nephrology for nephrocalcinosis, however states not pleased with monitoring; concerned about calcium, possible secondary hyperparathyroidism. Pt currently not seeing cardiology; states, "they released me and told me that nothing is wrong." Pt has seen psychiatry in the past for anxiety, depression, PTSD management; currently taking effexor; denies SI/HI. Labs due. Pt has no other complaints today.  Past Medical History:   Diagnosis Date    Anxiety     Aortitis 2016    Arteritis         DDD (degenerative disc disease), cervical     DDD (degenerative disc disease), lumbar     Endometriosis     Heart palpitations     History of lumbar spinal fusion     Pain, pelvic, female     PTSD (post-traumatic stress disorder)     Renal disorder     kidney stones      Social History     Socioeconomic History    Marital status:    Tobacco Use    Smoking status: Never    Smokeless tobacco: Never   Substance and Sexual Activity    Alcohol use: No    Drug use: No    Sexual activity: Yes     Past Surgical History:   Procedure Laterality Date    APPENDECTOMY      BILATERAL SALPINGO-OOPHORECTOMY (BSO) Bilateral 10/15/2020    Procedure: SALPINGO-OOPHORECTOMY, BILATERAL;  Surgeon: Zahira Membreno MD;  Location: [facility];  Service: OB/GYN;  Laterality: Bilateral;     SECTION, CLASSIC      x 3    DILATION AND CURETTAGE OF UTERUS      x 2    HYSTERECTOMY  10/15/2016    IMPLANTATION OF PERMANENT SACRAL NERVE STIMULATOR Left     since " 2017, [facility]     LAPAROSCOPIC SALPINGO-OOPHORECTOMY Bilateral 10/15/2020    Procedure: SALPINGO-OOPHORECTOMY, LAPAROSCOPIC-attempted;  Surgeon: Zahira Membreno MD;  Location: [facility] OR;  Service: OB/GYN;  Laterality: Bilateral;    LUMBAR EPIDURAL INJECTION      multiple times over last eight yrs    LUMBAR FUSION  12/31/15    LYSIS OF ADHESIONS N/A 10/15/2020    Procedure: LYSIS, ADHESIONS;  Surgeon: Zahira Membreno MD;  Location: [facility] OR;  Service: OB/GYN;  Laterality: N/A;    PELVIC LAPAROSCOPY      for endometriosis    RHIZOTOMY W/ RADIOFREQUENCY ABLATION      several ( back and neck ) over past 8 yrs    tonsillectomy      TONSILLECTOMY         HPI  Review of Systems   Constitutional: Negative.    HENT: Negative.     Eyes: Negative.    Respiratory: Negative.     Cardiovascular:  Positive for palpitations.   Gastrointestinal: Negative.    Endocrine: Negative.    Genitourinary: Negative.    Musculoskeletal:  Positive for arthralgias.   Integumentary:  Negative.   Allergic/Immunologic: Negative.    Neurological: Negative.    Psychiatric/Behavioral: Negative.          Objective     Physical Exam  Vitals and nursing note reviewed.   Constitutional:       Appearance: Normal appearance.   HENT:      Head: Normocephalic.      Right Ear: Tympanic membrane, ear canal and external ear normal.      Left Ear: Tympanic membrane, ear canal and external ear normal.      Nose: Nose normal.      Mouth/Throat:      Mouth: Mucous membranes are moist.      Pharynx: Oropharynx is clear.   Eyes:      Conjunctiva/sclera: Conjunctivae normal.      Pupils: Pupils are equal, round, and reactive to light.   Cardiovascular:      Rate and Rhythm: Normal rate and regular rhythm.      Pulses: Normal pulses.      Heart sounds: Normal heart sounds.   Pulmonary:      Effort: Pulmonary effort is normal.      Breath sounds: Normal breath sounds.   Abdominal:      General: Bowel sounds are normal.      Palpations: Abdomen is  soft.   Musculoskeletal:         General: Normal range of motion.      Cervical back: Normal range of motion and neck supple.   Skin:     General: Skin is warm.      Capillary Refill: Capillary refill takes 2 to 3 seconds.   Neurological:      Mental Status: She is alert and oriented to person, place, and time.   Psychiatric:         Attention and Perception: Attention and perception normal.         Mood and Affect: Affect normal. Mood is anxious.         Speech: Speech normal.         Behavior: Behavior normal.         Thought Content: Thought content normal.         Cognition and Memory: Cognition and memory normal.         Judgment: Judgment normal.          Assessment and Plan     Problem List Items Addressed This Visit       PTSD (post-traumatic stress disorder)    Anxiety and depression    Lupus (systemic lupus erythematosus)    Nephrocalcinosis    Relevant Orders    Ambulatory referral/consult to Nephrology    Primary hypertension    Relevant Orders    Ambulatory referral/consult to Cardiology     Other Visit Diagnoses       Annual physical exam    -  Primary    Relevant Orders    CBC Without Differential    Comprehensive Metabolic Panel    TSH    Vitamin D    Iron and TIBC    Ferritin    Vitamin B12    IN OFFICE EKG 12-LEAD (to [facility])    Lipid Panel    Aortitis        Relevant Orders    Ambulatory referral/consult to Cardiology    Arthralgia, unspecified joint        Relevant Orders    Uric Acid    Palpitations        Relevant Orders    IN OFFICE EKG 12-LEAD (to [place])    Ambulatory referral/consult to Cardiology            Ludy was seen today for annual exam.    Diagnoses and all orders for this visit:    Annual physical exam  Other forms of systemic lupus erythematosus, unspecified organ involvement status  Primary hypertension  Nephrocalcinosis  Aortitis  Anxiety and depression  PTSD (post-traumatic stress disorder)  Arthralgia, unspecified joint  Palpitations  -     Uric Acid; Future  -     Ambulatory  referral/consult to Cardiology; Future  -     Ambulatory referral/consult to Nephrology; Future  -     CBC Without Differential; Future  -     Comprehensive Metabolic Panel; Future  -     TSH; Future  -     Vitamin D; Future  -     Iron and TIBC; Future  -     Ferritin; Future  -     Vitamin B12; Future  -     IN OFFICE EKG 12-LEAD (to [facility])  -     Lipid Panel; Future  F/U with PCP, specialists as advised  Continue current medications  Report to ER immediately if symptoms worsen or persist

## 2023-04-06 NOTE — PATIENT INSTRUCTIONS
F/U with PCP, specialists as advised  Continue current medications  Report to ER immediately if symptoms worsen or persist

## 2023-04-07 ENCOUNTER — NURSE TRIAGE (OUTPATIENT)
Dept: ADMINISTRATIVE | Facility: CLINIC | Age: 38
End: 2023-04-07
Payer: COMMERCIAL

## 2023-04-07 LAB
25(OH)D3+25(OH)D2 SERPL-MCNC: 51 NG/ML (ref 30–96)
ERYTHROCYTE [DISTWIDTH] IN BLOOD BY AUTOMATED COUNT: 12.4 % (ref 11.5–14.5)
FERRITIN SERPL-MCNC: 139 NG/ML (ref 20–300)
HCT VFR BLD AUTO: 48.6 % (ref 37–48.5)
HGB BLD-MCNC: 15.4 G/DL (ref 12–16)
MCH RBC QN AUTO: 28.9 PG (ref 27–31)
MCHC RBC AUTO-ENTMCNC: 31.7 G/DL (ref 32–36)
MCV RBC AUTO: 91 FL (ref 82–98)
PLATELET # BLD AUTO: 335 K/UL (ref 150–450)
PMV BLD AUTO: 10.6 FL (ref 9.2–12.9)
RBC # BLD AUTO: 5.33 M/UL (ref 4–5.4)
TSH SERPL DL<=0.005 MIU/L-ACNC: 0.95 UIU/ML (ref 0.4–4)
VIT B12 SERPL-MCNC: 751 PG/ML (ref 210–950)
WBC # BLD AUTO: 8.77 K/UL (ref 3.9–12.7)

## 2023-04-07 NOTE — TELEPHONE ENCOUNTER
"Pt calling stating that she has been having body aches, joint pain and chronic fatigue. States "it feels like I have the flu" reports she knows she has gone in and out of a-fib, was seen in clinic yesterday. Offered triage and pt accepted. Endorses some difficulty breathing as she states she is JACOBS. Per protocol advised to ED NOW. verbalized understanding. Denies any further questions or concerns at this time, advised to call back if they have any that come up. Advised pt to call back with any other concerns or worsening symptoms. Verbalized understanding and will route message to provider.     Reason for Disposition   Difficulty breathing    Additional Information   Negative: SEVERE difficulty breathing (e.g., struggling for each breath, speaks in single words)   Negative: Shock suspected (e.g., cold/pale/clammy skin, too weak to stand, low BP, rapid pulse)   Negative: Difficult to awaken or acting confused (e.g., disoriented, slurred speech)   Negative: [1] Fainted > 15 minutes ago AND [2] still feels too weak or dizzy to stand   Negative: [1] SEVERE weakness (i.e., unable to walk or barely able to walk, requires support) AND [2] new-onset or worsening   Negative: Sounds like a life-threatening emergency to the triager    Protocols used: Weakness (Generalized) and Fatigue-A-AH    "

## 2023-04-07 NOTE — TELEPHONE ENCOUNTER
Was seen today because she was feeling bad and had blood work done. Caller is concerned regarding the results of the lab work since she was feeling so bad today. Advised that all critical values are reviewed by a physician and the patient is contacted if needed. Otherwise, lab values will be discussed during office hours.  Reason for Disposition   Caller requesting routine or non-urgent lab result    Protocols used: PCP Call - No Triage-A-

## 2023-04-10 ENCOUNTER — PATIENT MESSAGE (OUTPATIENT)
Dept: FAMILY MEDICINE | Facility: CLINIC | Age: 38
End: 2023-04-10
Payer: COMMERCIAL

## 2023-04-11 ENCOUNTER — TELEPHONE (OUTPATIENT)
Dept: FAMILY MEDICINE | Facility: CLINIC | Age: 38
End: 2023-04-11
Payer: COMMERCIAL

## 2023-04-11 DIAGNOSIS — E83.52 SERUM CALCIUM ELEVATED: Primary | ICD-10-CM

## 2023-04-13 ENCOUNTER — LAB VISIT (OUTPATIENT)
Dept: LAB | Facility: HOSPITAL | Age: 38
End: 2023-04-13
Attending: NURSE PRACTITIONER
Payer: COMMERCIAL

## 2023-04-13 DIAGNOSIS — E83.52 HYPERCALCEMIA: ICD-10-CM

## 2023-04-13 DIAGNOSIS — E83.52 SERUM CALCIUM ELEVATED: ICD-10-CM

## 2023-04-13 DIAGNOSIS — E83.52 HYPERCALCEMIA: Primary | ICD-10-CM

## 2023-04-13 LAB
ALBUMIN SERPL BCP-MCNC: 4.1 G/DL (ref 3.5–5.2)
ANION GAP SERPL CALC-SCNC: 13 MMOL/L (ref 8–16)
BUN SERPL-MCNC: 12 MG/DL (ref 6–20)
CALCIUM SERPL-MCNC: 9.7 MG/DL (ref 8.7–10.5)
CHLORIDE SERPL-SCNC: 97 MMOL/L (ref 95–110)
CO2 SERPL-SCNC: 29 MMOL/L (ref 23–29)
CREAT SERPL-MCNC: 0.7 MG/DL (ref 0.5–1.4)
EST. GFR  (NO RACE VARIABLE): >60 ML/MIN/1.73 M^2
GLUCOSE SERPL-MCNC: 83 MG/DL (ref 70–110)
PHOSPHATE SERPL-MCNC: 2.9 MG/DL (ref 2.7–4.5)
POTASSIUM SERPL-SCNC: 3.5 MMOL/L (ref 3.5–5.1)
PTH-INTACT SERPL-MCNC: 49.3 PG/ML (ref 9–77)
SODIUM SERPL-SCNC: 139 MMOL/L (ref 136–145)

## 2023-04-13 PROCEDURE — 83970 ASSAY OF PARATHORMONE: CPT | Performed by: NURSE PRACTITIONER

## 2023-04-13 PROCEDURE — 36415 COLL VENOUS BLD VENIPUNCTURE: CPT | Mod: PO | Performed by: NURSE PRACTITIONER

## 2023-04-13 PROCEDURE — 80069 RENAL FUNCTION PANEL: CPT | Performed by: INTERNAL MEDICINE

## 2023-04-14 NOTE — PROGRESS NOTES
Results have been released via [employer]. Please verify that these have been viewed by patient. If not, please call patient with results.      I have sent a message to them with the following interpretation (see below).    I have reviewed your recent results.    Calcium level is now normal. PTH is also normal. No need for further work up or studies.    Please do not hesitate to call or message with any additional questions or concerns.    Lucy Dubose MD

## 2023-04-17 ENCOUNTER — LAB VISIT (OUTPATIENT)
Dept: LAB | Facility: HOSPITAL | Age: 38
End: 2023-04-17
Attending: INTERNAL MEDICINE
Payer: COMMERCIAL

## 2023-04-17 ENCOUNTER — TELEPHONE (OUTPATIENT)
Dept: NEPHROLOGY | Facility: CLINIC | Age: 38
End: 2023-04-17
Payer: COMMERCIAL

## 2023-04-17 DIAGNOSIS — N29 NEPHROCALCINOSIS: ICD-10-CM

## 2023-04-17 DIAGNOSIS — E83.59 NEPHROCALCINOSIS: ICD-10-CM

## 2023-04-17 PROCEDURE — 82570 ASSAY OF URINE CREATININE: CPT | Performed by: INTERNAL MEDICINE

## 2023-04-17 NOTE — TELEPHONE ENCOUNTER
----- Message from Dayron Lozada sent at 4/17/2023  8:32 AM CDT -----  Contact: self  Type: Needs Medical Advice  Who Called:  Patient    Best Call Back Number: [phone]    Additional Information: Pt states she need to speak to office about getting 24hr urine specimen orders sent over to [facility]/Lab she is currently there to drop off but no orders.Please call back

## 2023-04-20 LAB
AMMONIA 24H UR-SRATE: 40 MMOL/24 H (ref 15–56)
BSA: ABNORMAL 1.73M(2)
CA H2 PHOS DIHYD 24H SATFR UR: -0.54 DG
CALCIUM 24H UR-MRATE: 236 MG/24 H
CAOX 24H ENGDIFF UR: 0.64 DG
CHLORIDE 24H UR-SRATE: 84 MMOL/24 H (ref 34–286)
CITRATE 24H UR-MRATE: 192 MG/24 H (ref 292–1191)
CLINICAL BIOCHEMIST REVIEW: ABNORMAL
COLLECT DURATION TIME UR: 24 H
CREAT 24H UR-MRATE: 998 MG/24 H (ref 603–1783)
HYDROXYAPATITE 24H ENGDIFF UR: 5.44 DG
MAGNESIUM 24H UR-MRATE: 184 MG/24 H (ref 51–269)
OSMOLALITY 24H UR: 218 MOSM/KG (ref 150–1150)
OXALATE 24H UR-MRATE: 14.1 MG/24 H (ref 9.7–40.5)
OXALATE 24H UR-SRATE: 0.16 MMOL/24 H (ref 0.11–0.46)
PH 24H UR: 6.7 [PH] (ref 4.5–8)
PHOSPHATE 24H UR-MRATE: 236 MG/24 H (ref 226–1797)
POTASSIUM 24H UR-SRATE: 37 MMOL/24 H (ref 16–105)
PROTEIN CATABOLIC RATE, URINE: 66 G/24 H (ref 56–125)
SODIUM 24H UR-SRATE: 105 MMOL/24 H (ref 22–328)
SPECIMEN VOL 24H UR: 2625 ML
SULFATE 24H UR-SRATE: 22 MMOL/24 H (ref 7–47)
URATE 24H SATFR UR: -4.9 DG
URATE 24H UR-MRATE: 420 MG/24 H (ref 250–750)
UUN 24H UR-MRATE: 6.6 G/24 H (ref 7–42)

## 2023-04-21 ENCOUNTER — OFFICE VISIT (OUTPATIENT)
Dept: NEPHROLOGY | Facility: CLINIC | Age: 38
End: 2023-04-21
Payer: COMMERCIAL

## 2023-04-21 ENCOUNTER — PATIENT MESSAGE (OUTPATIENT)
Dept: NEPHROLOGY | Facility: CLINIC | Age: 38
End: 2023-04-21

## 2023-04-21 VITALS
HEIGHT: 63 IN | SYSTOLIC BLOOD PRESSURE: 112 MMHG | WEIGHT: 127 LBS | BODY MASS INDEX: 22.5 KG/M2 | HEART RATE: 75 BPM | DIASTOLIC BLOOD PRESSURE: 70 MMHG | OXYGEN SATURATION: 99 %

## 2023-04-21 DIAGNOSIS — N29 NEPHROCALCINOSIS: ICD-10-CM

## 2023-04-21 DIAGNOSIS — E83.59 NEPHROCALCINOSIS: ICD-10-CM

## 2023-04-21 DIAGNOSIS — I10 PRIMARY HYPERTENSION: Primary | ICD-10-CM

## 2023-04-21 DIAGNOSIS — M32.8 OTHER FORMS OF SYSTEMIC LUPUS ERYTHEMATOSUS, UNSPECIFIED ORGAN INVOLVEMENT STATUS: ICD-10-CM

## 2023-04-21 PROCEDURE — 3066F PR DOCUMENTATION OF TREATMENT FOR NEPHROPATHY: ICD-10-PCS | Mod: CPTII,S$GLB,, | Performed by: INTERNAL MEDICINE

## 2023-04-21 PROCEDURE — 1159F PR MEDICATION LIST DOCUMENTED IN MEDICAL RECORD: ICD-10-PCS | Mod: CPTII,S$GLB,, | Performed by: INTERNAL MEDICINE

## 2023-04-21 PROCEDURE — 1160F RVW MEDS BY RX/DR IN RCRD: CPT | Mod: CPTII,S$GLB,, | Performed by: INTERNAL MEDICINE

## 2023-04-21 PROCEDURE — 99999 PR PBB SHADOW E&M-EST. PATIENT-LVL IV: CPT | Mod: PBBFAC,,, | Performed by: INTERNAL MEDICINE

## 2023-04-21 PROCEDURE — 99214 PR OFFICE/OUTPT VISIT, EST, LEVL IV, 30-39 MIN: ICD-10-PCS | Mod: S$GLB,,, | Performed by: INTERNAL MEDICINE

## 2023-04-21 PROCEDURE — 3074F PR MOST RECENT SYSTOLIC BLOOD PRESSURE < 130 MM HG: ICD-10-PCS | Mod: CPTII,S$GLB,, | Performed by: INTERNAL MEDICINE

## 2023-04-21 PROCEDURE — 3008F BODY MASS INDEX DOCD: CPT | Mod: CPTII,S$GLB,, | Performed by: INTERNAL MEDICINE

## 2023-04-21 PROCEDURE — 3078F DIAST BP <80 MM HG: CPT | Mod: CPTII,S$GLB,, | Performed by: INTERNAL MEDICINE

## 2023-04-21 PROCEDURE — 1160F PR REVIEW ALL MEDS BY PRESCRIBER/CLIN PHARMACIST DOCUMENTED: ICD-10-PCS | Mod: CPTII,S$GLB,, | Performed by: INTERNAL MEDICINE

## 2023-04-21 PROCEDURE — 3066F NEPHROPATHY DOC TX: CPT | Mod: CPTII,S$GLB,, | Performed by: INTERNAL MEDICINE

## 2023-04-21 PROCEDURE — 3074F SYST BP LT 130 MM HG: CPT | Mod: CPTII,S$GLB,, | Performed by: INTERNAL MEDICINE

## 2023-04-21 PROCEDURE — 3078F PR MOST RECENT DIASTOLIC BLOOD PRESSURE < 80 MM HG: ICD-10-PCS | Mod: CPTII,S$GLB,, | Performed by: INTERNAL MEDICINE

## 2023-04-21 PROCEDURE — 3008F PR BODY MASS INDEX (BMI) DOCUMENTED: ICD-10-PCS | Mod: CPTII,S$GLB,, | Performed by: INTERNAL MEDICINE

## 2023-04-21 PROCEDURE — 99214 OFFICE O/P EST MOD 30 MIN: CPT | Mod: S$GLB,,, | Performed by: INTERNAL MEDICINE

## 2023-04-21 PROCEDURE — 1159F MED LIST DOCD IN RCRD: CPT | Mod: CPTII,S$GLB,, | Performed by: INTERNAL MEDICINE

## 2023-04-21 PROCEDURE — 99999 PR PBB SHADOW E&M-EST. PATIENT-LVL IV: ICD-10-PCS | Mod: PBBFAC,,, | Performed by: INTERNAL MEDICINE

## 2023-04-21 RX ORDER — POTASSIUM CITRATE 5 MEQ/1
5 TABLET, EXTENDED RELEASE ORAL
Qty: 90 TABLET | Refills: 11 | Status: SHIPPED | OUTPATIENT
Start: 2023-04-21 | End: 2023-06-12

## 2023-04-21 RX ORDER — CHLORTHALIDONE 25 MG/1
25 TABLET ORAL DAILY
Qty: 90 TABLET | Refills: 3 | Status: SHIPPED | OUTPATIENT
Start: 2023-04-21 | End: 2023-09-08 | Stop reason: SDUPTHER

## 2023-04-21 RX ORDER — POTASSIUM CITRATE 5 MEQ/1
5 TABLET, EXTENDED RELEASE ORAL
Qty: 90 TABLET | Refills: 11 | Status: SHIPPED | OUTPATIENT
Start: 2023-04-21 | End: 2023-04-21 | Stop reason: SDUPTHER

## 2023-04-21 NOTE — PROGRESS NOTES
"Subjective:       Patient ID: Ludy Carney is a 38 y.o. White female who presents for follow up on Nephrocalcinosis.     Since last seen at nephrology clinic, Ludy Carney denies any new hospitalizations or new medications. 24 hour urine  completed.  No uremic symptoms, not volume overloaded.    Reports taking their medications on time, without missed doses. As to their chronic conditions:  - Nephrocalcinosis due to Ca hypoxyapetite stones, last 24 hour urine showed good volume, low sodium and high Ur calcium on Chlorthalidone  - Lupus without renal manifestation      Review of Systems   Constitutional:  Negative for appetite change, chills and fever.   HENT:  Negative for congestion.    Eyes:  Negative for visual disturbance.   Respiratory:  Negative for cough and shortness of breath.    Cardiovascular:  Negative for chest pain and leg swelling.   Gastrointestinal:  Negative for abdominal pain, diarrhea, nausea and vomiting.   Genitourinary:  Negative for difficulty urinating, dysuria and hematuria.   Musculoskeletal:  Negative for myalgias.   Skin:  Negative for rash.   Neurological:  Negative for headaches.   Hematological:  Bruises/bleeds easily.   Psychiatric/Behavioral:  Negative for sleep disturbance.      The past medical, family and social histories were reviewed for this encounter.     /70 (BP Location: Left arm, Patient Position: Sitting, BP Method: Medium (Manual))   Pulse 75   Ht 5' 3" (1.6 m)   Wt 57.6 kg (126 lb 15.8 oz)   LMP 10/05/2016   SpO2 99%   BMI 22.49 kg/m²     Objective:      Physical Exam  Vitals and nursing note reviewed.   Constitutional:       Appearance: Normal appearance.   HENT:      Head: Normocephalic and atraumatic.      Mouth/Throat:      Mouth: Mucous membranes are moist.      Pharynx: Oropharynx is clear.   Eyes:      Extraocular Movements: Extraocular movements intact.      Conjunctiva/sclera: Conjunctivae normal.   Neck:      Vascular: " No carotid bruit.   Cardiovascular:      Rate and Rhythm: Normal rate and regular rhythm.      Heart sounds: Normal heart sounds.   Pulmonary:      Effort: Pulmonary effort is normal. No respiratory distress.      Breath sounds: Normal breath sounds.   Abdominal:      General: Bowel sounds are normal. There is no distension.      Palpations: Abdomen is soft.      Tenderness: There is no abdominal tenderness.   Musculoskeletal:         General: No swelling or tenderness. Normal range of motion.      Cervical back: Normal range of motion. No tenderness.   Lymphadenopathy:      Cervical: No cervical adenopathy.   Skin:     General: Skin is warm and dry.      Capillary Refill: Capillary refill takes less than 2 seconds.      Coloration: Skin is not jaundiced.   Neurological:      General: No focal deficit present.      Mental Status: She is alert and oriented to person, place, and time.   Psychiatric:         Mood and Affect: Mood normal.         Behavior: Behavior normal.         Judgment: Judgment normal.       Assessment:       1. Primary hypertension    2. Nephrocalcinosis    3. Other forms of systemic lupus erythematosus, unspecified organ involvement status          Plan:   Return to clinic in 2 months    Baseline creatinine is 0.7 mg/dL and no proteinuria  Lab Results   Component Value Date    CREATININE 0.7 04/13/2023     Nephrolithiasis   - 24 hour urine in 1 month    HTN   - On Chlorthalidone    Lupus    - Q6 mo UAs    DDD   - No NSAIDs    Med changes:   Start Potasium citrate 5 meq three times per day

## 2023-04-24 ENCOUNTER — PATIENT MESSAGE (OUTPATIENT)
Dept: FAMILY MEDICINE | Facility: CLINIC | Age: 38
End: 2023-04-24
Payer: COMMERCIAL

## 2023-04-24 DIAGNOSIS — R40.0 DAYTIME SLEEPINESS: Primary | ICD-10-CM

## 2023-04-27 NOTE — TELEPHONE ENCOUNTER
I have signed for the following orders AND/OR meds.  Please call the patient and ask the patient to schedule the testing AND/OR inform about any medications that were sent. Medications have been sent to pharmacy listed below      Orders Placed This Encounter   Procedures    Ambulatory referral/consult to Sleep Disorders     Standing Status:   Future     Standing Expiration Date:   5/26/2024     Referral Priority:   Routine     Referral Type:   Consultation     Requested Specialty:   Sleep Medicine     Number of Visits Requested:   1              [facility] - [address] [address]  Phone: [phone] Fax: [phone]    [facility] - [place], [place] - [address] [facility] [address]  [id number] [facility] [address]  [facility] [address]  Phone: [phone] Fax: [phone]

## 2023-05-01 ENCOUNTER — PATIENT MESSAGE (OUTPATIENT)
Dept: FAMILY MEDICINE | Facility: CLINIC | Age: 38
End: 2023-05-01
Payer: COMMERCIAL

## 2023-05-08 ENCOUNTER — PATIENT MESSAGE (OUTPATIENT)
Dept: NEPHROLOGY | Facility: CLINIC | Age: 38
End: 2023-05-08
Payer: COMMERCIAL

## 2023-05-08 ENCOUNTER — LAB VISIT (OUTPATIENT)
Dept: LAB | Facility: HOSPITAL | Age: 38
End: 2023-05-08
Attending: INTERNAL MEDICINE
Payer: COMMERCIAL

## 2023-05-08 DIAGNOSIS — N29 NEPHROCALCINOSIS: Primary | ICD-10-CM

## 2023-05-08 DIAGNOSIS — I10 PRIMARY HYPERTENSION: ICD-10-CM

## 2023-05-08 DIAGNOSIS — E83.59 NEPHROCALCINOSIS: Primary | ICD-10-CM

## 2023-05-08 DIAGNOSIS — M32.8 OTHER FORMS OF SYSTEMIC LUPUS ERYTHEMATOSUS, UNSPECIFIED ORGAN INVOLVEMENT STATUS: ICD-10-CM

## 2023-05-08 DIAGNOSIS — E83.59 NEPHROCALCINOSIS: ICD-10-CM

## 2023-05-08 DIAGNOSIS — N29 NEPHROCALCINOSIS: ICD-10-CM

## 2023-05-08 LAB
ALBUMIN SERPL BCP-MCNC: 3.7 G/DL (ref 3.5–5.2)
ANION GAP SERPL CALC-SCNC: 7 MMOL/L (ref 8–16)
BUN SERPL-MCNC: 9 MG/DL (ref 6–20)
CALCIUM SERPL-MCNC: 8.9 MG/DL (ref 8.7–10.5)
CHLORIDE SERPL-SCNC: 99 MMOL/L (ref 95–110)
CO2 SERPL-SCNC: 31 MMOL/L (ref 23–29)
CREAT SERPL-MCNC: 0.6 MG/DL (ref 0.5–1.4)
EST. GFR  (NO RACE VARIABLE): >60 ML/MIN/1.73 M^2
GLUCOSE SERPL-MCNC: 69 MG/DL (ref 70–110)
PHOSPHATE SERPL-MCNC: 2.5 MG/DL (ref 2.7–4.5)
POTASSIUM SERPL-SCNC: 3.7 MMOL/L (ref 3.5–5.1)
SODIUM SERPL-SCNC: 137 MMOL/L (ref 136–145)

## 2023-05-08 PROCEDURE — 80069 RENAL FUNCTION PANEL: CPT | Performed by: INTERNAL MEDICINE

## 2023-05-08 PROCEDURE — 36415 COLL VENOUS BLD VENIPUNCTURE: CPT | Mod: PO | Performed by: INTERNAL MEDICINE

## 2023-05-26 ENCOUNTER — PATIENT MESSAGE (OUTPATIENT)
Dept: FAMILY MEDICINE | Facility: CLINIC | Age: 38
End: 2023-05-26
Payer: COMMERCIAL

## 2023-05-26 DIAGNOSIS — M79.10 MYALGIA: Primary | ICD-10-CM

## 2023-05-26 DIAGNOSIS — Z92.241 HISTORY OF RECENT STEROID USE: ICD-10-CM

## 2023-05-30 NOTE — TELEPHONE ENCOUNTER
Labs ordered. Need to be done between 8-9 am.      I have signed for the following orders AND/OR meds.  Please call the patient and ask the patient to schedule the testing AND/OR inform about any medications that were sent. Medications have been sent to pharmacy listed below      Orders Placed This Encounter   Procedures    Cortisol, 8AM     Standing Status:   Future     Standing Expiration Date:   7/28/2024    Sedimentation rate     Standing Status:   Future     Standing Expiration Date:   7/28/2024    C-REACTIVE PROTEIN     Standing Status:   Future     Standing Expiration Date:   7/28/2024    CK     Standing Status:   Future     Standing Expiration Date:   7/28/2024              [facility] - [address] [address]  Phone: [phone] Fax: [phone]    [facility] - [place], [place] - [address] [facility] [address]  [id number] [address]  [facility] [address]  Phone: [phone] Fax: [phone]

## 2023-06-01 ENCOUNTER — PATIENT MESSAGE (OUTPATIENT)
Dept: FAMILY MEDICINE | Facility: CLINIC | Age: 38
End: 2023-06-01
Payer: COMMERCIAL

## 2023-06-02 ENCOUNTER — LAB VISIT (OUTPATIENT)
Dept: LAB | Facility: HOSPITAL | Age: 38
End: 2023-06-02
Attending: INTERNAL MEDICINE
Payer: COMMERCIAL

## 2023-06-02 DIAGNOSIS — M79.10 MYALGIA: ICD-10-CM

## 2023-06-02 DIAGNOSIS — Z92.241 HISTORY OF RECENT STEROID USE: ICD-10-CM

## 2023-06-02 LAB
CK SERPL-CCNC: 39 U/L (ref 20–180)
CORTIS SERPL-MCNC: 2.9 UG/DL (ref 4.3–22.4)
CRP SERPL-MCNC: 3.2 MG/L (ref 0–8.2)
ERYTHROCYTE [SEDIMENTATION RATE] IN BLOOD BY WESTERGREN METHOD: 7 MM/HR (ref 0–20)

## 2023-06-02 PROCEDURE — 85651 RBC SED RATE NONAUTOMATED: CPT | Mod: PO | Performed by: INTERNAL MEDICINE

## 2023-06-02 PROCEDURE — 86140 C-REACTIVE PROTEIN: CPT | Performed by: INTERNAL MEDICINE

## 2023-06-02 PROCEDURE — 36415 COLL VENOUS BLD VENIPUNCTURE: CPT | Mod: PO | Performed by: INTERNAL MEDICINE

## 2023-06-02 PROCEDURE — 82533 TOTAL CORTISOL: CPT | Performed by: INTERNAL MEDICINE

## 2023-06-02 PROCEDURE — 82550 ASSAY OF CK (CPK): CPT | Performed by: INTERNAL MEDICINE

## 2023-06-06 ENCOUNTER — PATIENT MESSAGE (OUTPATIENT)
Dept: FAMILY MEDICINE | Facility: CLINIC | Age: 38
End: 2023-06-06
Payer: COMMERCIAL

## 2023-06-11 ENCOUNTER — PATIENT MESSAGE (OUTPATIENT)
Dept: FAMILY MEDICINE | Facility: CLINIC | Age: 38
End: 2023-06-11
Payer: COMMERCIAL

## 2023-06-11 DIAGNOSIS — R79.89 ABNORMAL CORTISOL LEVEL: Primary | ICD-10-CM

## 2023-06-11 RX ORDER — DEXAMETHASONE 1 MG/1
1 TABLET ORAL ONCE
Qty: 1 TABLET | Refills: 0 | Status: SHIPPED | OUTPATIENT
Start: 2023-06-11 | End: 2023-06-11 | Stop reason: CLARIF

## 2023-06-12 RX ORDER — POTASSIUM CITRATE 5 MEQ/1
5 TABLET, EXTENDED RELEASE ORAL
Qty: 270 TABLET | Refills: 3 | Status: SHIPPED | OUTPATIENT
Start: 2023-06-12 | End: 2024-06-11

## 2023-06-12 NOTE — PROGRESS NOTES
Results have been released via [employer]. Please verify that these have been viewed by patient. If not, please call patient with results.    Please schedule the following orders:  Please call [facility] and cancel dexamethasone prescription. That was a mistake.  Endocrinology referral    I have sent a message to them with the following interpretation (see below).    I have reviewed your recent results.    Your cortisol level was slightly low. I just want to verify that when you came if for this labs that you had not been awake for more than 3 hours and that you have not had any steroids recently?    If not, then I will recommend that you see an endocrinology specialist for further evaluation.    I did accidentally send in a medication to your local pharmacy. My staff should be calling to cancel but in case the pharmacy calls you first, please ignore.    Please do not hesitate to call or message with any additional questions or concerns.    Lucy Dubose MD

## 2023-06-14 ENCOUNTER — OFFICE VISIT (OUTPATIENT)
Dept: NEPHROLOGY | Facility: CLINIC | Age: 38
End: 2023-06-14
Payer: COMMERCIAL

## 2023-06-14 VITALS
DIASTOLIC BLOOD PRESSURE: 70 MMHG | HEIGHT: 63 IN | BODY MASS INDEX: 22.32 KG/M2 | SYSTOLIC BLOOD PRESSURE: 122 MMHG | WEIGHT: 126 LBS

## 2023-06-14 DIAGNOSIS — M32.8 OTHER FORMS OF SYSTEMIC LUPUS ERYTHEMATOSUS, UNSPECIFIED ORGAN INVOLVEMENT STATUS: ICD-10-CM

## 2023-06-14 DIAGNOSIS — E83.59 NEPHROCALCINOSIS: Primary | ICD-10-CM

## 2023-06-14 DIAGNOSIS — N29 NEPHROCALCINOSIS: Primary | ICD-10-CM

## 2023-06-14 PROCEDURE — 3066F NEPHROPATHY DOC TX: CPT | Mod: CPTII,S$GLB,, | Performed by: INTERNAL MEDICINE

## 2023-06-14 PROCEDURE — 99999 PR PBB SHADOW E&M-EST. PATIENT-LVL III: CPT | Mod: PBBFAC,,, | Performed by: INTERNAL MEDICINE

## 2023-06-14 PROCEDURE — 3074F SYST BP LT 130 MM HG: CPT | Mod: CPTII,S$GLB,, | Performed by: INTERNAL MEDICINE

## 2023-06-14 PROCEDURE — 3008F BODY MASS INDEX DOCD: CPT | Mod: CPTII,S$GLB,, | Performed by: INTERNAL MEDICINE

## 2023-06-14 PROCEDURE — 99215 PR OFFICE/OUTPT VISIT, EST, LEVL V, 40-54 MIN: ICD-10-PCS | Mod: S$GLB,,, | Performed by: INTERNAL MEDICINE

## 2023-06-14 PROCEDURE — 3078F DIAST BP <80 MM HG: CPT | Mod: CPTII,S$GLB,, | Performed by: INTERNAL MEDICINE

## 2023-06-14 PROCEDURE — 99215 OFFICE O/P EST HI 40 MIN: CPT | Mod: S$GLB,,, | Performed by: INTERNAL MEDICINE

## 2023-06-14 PROCEDURE — 3008F PR BODY MASS INDEX (BMI) DOCUMENTED: ICD-10-PCS | Mod: CPTII,S$GLB,, | Performed by: INTERNAL MEDICINE

## 2023-06-14 PROCEDURE — 99999 PR PBB SHADOW E&M-EST. PATIENT-LVL III: ICD-10-PCS | Mod: PBBFAC,,, | Performed by: INTERNAL MEDICINE

## 2023-06-14 PROCEDURE — 3074F PR MOST RECENT SYSTOLIC BLOOD PRESSURE < 130 MM HG: ICD-10-PCS | Mod: CPTII,S$GLB,, | Performed by: INTERNAL MEDICINE

## 2023-06-14 PROCEDURE — 3078F PR MOST RECENT DIASTOLIC BLOOD PRESSURE < 80 MM HG: ICD-10-PCS | Mod: CPTII,S$GLB,, | Performed by: INTERNAL MEDICINE

## 2023-06-14 PROCEDURE — 3066F PR DOCUMENTATION OF TREATMENT FOR NEPHROPATHY: ICD-10-PCS | Mod: CPTII,S$GLB,, | Performed by: INTERNAL MEDICINE

## 2023-06-14 NOTE — TELEPHONE ENCOUNTER
Please see if she would like to repeat the cortisol test but I would want her to be off of steroids for at least a couple of weeks and also come in for the test within 3 hours of waking up.

## 2023-06-14 NOTE — PATIENT INSTRUCTIONS
"No worries about   --low oxalate  --low purines    Avoid processed sugars (4-5X a month)   Continue low sodium  3 Liters fluid (water is best)     No need for "Renal Diet" (low phos and low potassium)   "

## 2023-06-14 NOTE — PROGRESS NOTES
Subjective:      Patient ID: Ludy Carney is a 38 y.o. White female who presents for re-evaluation of progression of Nephrocalcinosis     HPI    Seen by Nephrology (Dr. Fonseca) in the past for nephrocalcinosis also with a history of SLE but no known Lupus Nephritis in the past     UTIs during pregnancy   Gross hematuria  No active stone passage  Hygroten for stones, not lowering BP too low, in fact notes her BP has been a little elevated, lowest BP reading has been 102/70. Tolerating K-citrate without GI SEs. She is avoiding oxalate foods, lowered sodium intake, feeling overwhelmed with diet     Lupus--vascultitis/pericarditis, no kidney disease   GI issues--IBS (?)    Review of Systems   Constitutional:  Negative for activity change, appetite change and unexpected weight change.   Respiratory:  Negative for shortness of breath.    Cardiovascular:  Negative for leg swelling.   Gastrointestinal:  Positive for constipation and diarrhea.   Genitourinary:  Negative for decreased urine volume, flank pain and hematuria.   Musculoskeletal:  Positive for arthralgias and joint swelling.    Objective:     Physical Exam  Vitals and nursing note reviewed.   Constitutional:       Appearance: She is normal weight.   HENT:      Head: Atraumatic.   Eyes:      General: No scleral icterus.  Cardiovascular:      Rate and Rhythm: Normal rate.   Pulmonary:      Effort: Pulmonary effort is normal. No respiratory distress.   Abdominal:      Tenderness: There is no abdominal tenderness.   Musculoskeletal:      Right lower leg: No edema.      Left lower leg: No edema.   Skin:     General: Skin is warm and dry.   Neurological:      Mental Status: She is alert and oriented to person, place, and time.   Psychiatric:         Mood and Affect: Mood normal.     Assessment:     1. Nephrocalcinosis    2. Other forms of systemic lupus erythematosus, unspecified organ involvement status       Plan:     Nephrocalcinosis  - discussed  "natural history   - discussed initial 24 hour urine stone study, and current management   - OK to avoid unhealthy oxalate foods but can increase oxalate consumption   - continue low sodium diet and pushing fluids, mostly water   - avoid processed sugars   - continue magnesium   - does not need a "renal diet"  (low phos or low potassium)  - PTH was normal       Await 24 hour urine repeat study  60 minutes of total time spent on the encounter, which includes face to face time and non-face to face time preparing to see the patient (eg, review of tests), Obtaining and/or reviewing separately obtained history, Documenting clinical information in the electronic or other health record, Independently interpreting results (not separately reported) and communicating results to the patient/family/caregiver, or Care coordination (not separately reported).        "

## 2023-06-15 ENCOUNTER — PATIENT MESSAGE (OUTPATIENT)
Dept: FAMILY MEDICINE | Facility: CLINIC | Age: 38
End: 2023-06-15
Payer: COMMERCIAL

## 2023-06-19 ENCOUNTER — TELEPHONE (OUTPATIENT)
Dept: NEPHROLOGY | Facility: CLINIC | Age: 38
End: 2023-06-19
Payer: COMMERCIAL

## 2023-06-19 NOTE — TELEPHONE ENCOUNTER
----- Message from Keshawn Montoya MD sent at 6/18/2023  3:37 PM CDT -----  RTC after 24 hour urine stone study complete, pt to perform in the next couple of weeks

## 2023-07-13 ENCOUNTER — LAB VISIT (OUTPATIENT)
Dept: LAB | Facility: HOSPITAL | Age: 38
End: 2023-07-13
Attending: INTERNAL MEDICINE
Payer: COMMERCIAL

## 2023-07-13 DIAGNOSIS — Z13.220 ENCOUNTER FOR LIPID SCREENING FOR CARDIOVASCULAR DISEASE: ICD-10-CM

## 2023-07-13 DIAGNOSIS — Z13.6 ENCOUNTER FOR LIPID SCREENING FOR CARDIOVASCULAR DISEASE: ICD-10-CM

## 2023-07-13 LAB
CHOLEST SERPL-MCNC: 263 MG/DL (ref 120–199)
CHOLEST/HDLC SERPL: 4.4 {RATIO} (ref 2–5)
HDLC SERPL-MCNC: 60 MG/DL (ref 40–75)
HDLC SERPL: 22.8 % (ref 20–50)
LDLC SERPL CALC-MCNC: 170.4 MG/DL (ref 63–159)
NONHDLC SERPL-MCNC: 203 MG/DL
TRIGL SERPL-MCNC: 163 MG/DL (ref 30–150)

## 2023-07-13 PROCEDURE — 80061 LIPID PANEL: CPT | Performed by: INTERNAL MEDICINE

## 2023-07-13 PROCEDURE — 36415 COLL VENOUS BLD VENIPUNCTURE: CPT | Mod: PO | Performed by: INTERNAL MEDICINE

## 2023-07-18 LAB — CRC RECOMMENDATION EXT: NORMAL

## 2023-08-14 ENCOUNTER — PATIENT MESSAGE (OUTPATIENT)
Dept: NEPHROLOGY | Facility: CLINIC | Age: 38
End: 2023-08-14
Payer: COMMERCIAL

## 2023-08-14 ENCOUNTER — TELEPHONE (OUTPATIENT)
Dept: NEPHROLOGY | Facility: CLINIC | Age: 38
End: 2023-08-14
Payer: COMMERCIAL

## 2023-08-14 DIAGNOSIS — E83.59 NEPHROCALCINOSIS: Primary | ICD-10-CM

## 2023-08-14 DIAGNOSIS — N29 NEPHROCALCINOSIS: Primary | ICD-10-CM

## 2023-08-14 NOTE — TELEPHONE ENCOUNTER
Patient is scheduled to see  on the 8th. She has an order for a 24 hr urine. I am not sure if she needs an updated order. Does patient need to get RP prior to visit?She also would like a recommendation for a new pcp. I am sending this message to . Please advise.

## 2023-08-14 NOTE — TELEPHONE ENCOUNTER
----- Message from Cesar Epperson sent at 8/14/2023  3:07 PM CDT -----  Contact: pt at [phone]  Type: Needs Medical Advice  Who Called:  pt  Best Call Back Number: [phone]  Additional Information: pt is calling the office regarding her message  If I see a pcp that isn't through [facility], will Dr. Fonseca still be able to treat me? Ricci has really screwed things up and I need orders from Dr. Fonseca for 24hr urine. I've been reluctant to make contact due to [facility] and really don't trust any other doctor but Dr. Fonseca. If he is still willing to see me then I would need the 24hr urine orders in his name. They are in a different doctor's name. It's a long story and too much to type.     PER THE PT PLEASE CALL BACK TODAY ASAP

## 2023-08-14 NOTE — TELEPHONE ENCOUNTER
Patient will go next Monday to turn in 24 hr and do labs. I will ask  if he recommends a PCP for patient.

## 2023-08-21 ENCOUNTER — LAB VISIT (OUTPATIENT)
Dept: LAB | Facility: HOSPITAL | Age: 38
End: 2023-08-21
Attending: INTERNAL MEDICINE
Payer: COMMERCIAL

## 2023-08-21 DIAGNOSIS — N29 NEPHROCALCINOSIS: ICD-10-CM

## 2023-08-21 DIAGNOSIS — E83.59 NEPHROCALCINOSIS: ICD-10-CM

## 2023-08-21 LAB
ALBUMIN SERPL BCP-MCNC: 3.8 G/DL (ref 3.5–5.2)
ANION GAP SERPL CALC-SCNC: 11 MMOL/L (ref 8–16)
BUN SERPL-MCNC: 11 MG/DL (ref 6–20)
CALCIUM SERPL-MCNC: 9 MG/DL (ref 8.7–10.5)
CHLORIDE SERPL-SCNC: 96 MMOL/L (ref 95–110)
CO2 SERPL-SCNC: 29 MMOL/L (ref 23–29)
CREAT SERPL-MCNC: 0.7 MG/DL (ref 0.5–1.4)
EST. GFR  (NO RACE VARIABLE): >60 ML/MIN/1.73 M^2
GLUCOSE SERPL-MCNC: 79 MG/DL (ref 70–110)
PHOSPHATE SERPL-MCNC: 3.2 MG/DL (ref 2.7–4.5)
POTASSIUM SERPL-SCNC: 3.4 MMOL/L (ref 3.5–5.1)
SODIUM SERPL-SCNC: 136 MMOL/L (ref 136–145)

## 2023-08-21 PROCEDURE — 80069 RENAL FUNCTION PANEL: CPT | Performed by: INTERNAL MEDICINE

## 2023-08-21 PROCEDURE — 36415 COLL VENOUS BLD VENIPUNCTURE: CPT | Mod: PO | Performed by: INTERNAL MEDICINE

## 2023-08-30 ENCOUNTER — HOSPITAL ENCOUNTER (OUTPATIENT)
Dept: RADIOLOGY | Facility: HOSPITAL | Age: 38
Discharge: HOME | End: 2023-08-30
Attending: INTERNAL MEDICINE
Payer: COMMERCIAL

## 2023-08-30 DIAGNOSIS — Z12.31 OTHER SCREENING MAMMOGRAM: ICD-10-CM

## 2023-08-30 PROCEDURE — 77067 MAMMO DIGITAL SCREENING BILAT WITH TOMO: ICD-10-PCS | Mod: 26,,, | Performed by: RADIOLOGY

## 2023-08-30 PROCEDURE — 77067 SCR MAMMO BI INCL CAD: CPT | Mod: 26,,, | Performed by: RADIOLOGY

## 2023-08-30 PROCEDURE — 77063 MAMMO DIGITAL SCREENING BILAT WITH TOMO: ICD-10-PCS | Mod: 26,,, | Performed by: RADIOLOGY

## 2023-08-30 PROCEDURE — 77067 SCR MAMMO BI INCL CAD: CPT | Mod: TC,PO

## 2023-08-30 PROCEDURE — 77063 BREAST TOMOSYNTHESIS BI: CPT | Mod: 26,,, | Performed by: RADIOLOGY

## 2023-09-07 ENCOUNTER — PATIENT OUTREACH (OUTPATIENT)
Dept: ADMINISTRATIVE | Facility: HOSPITAL | Age: 38
End: 2023-09-07
Payer: COMMERCIAL

## 2023-09-07 ENCOUNTER — TELEPHONE (OUTPATIENT)
Dept: RADIOLOGY | Facility: HOSPITAL | Age: 38
End: 2023-09-07
Payer: COMMERCIAL

## 2023-09-07 NOTE — LETTER
AUTHORIZATION FOR RELEASE OF   CONFIDENTIAL INFORMATION    We are seeing Ludy Carney, date of birth 1985, in the clinic at [facility]. Lucy Dubose MD is the patient's PCP. Ludy Carney has an outstanding lab/procedure at the time we reviewed her chart. In order to help keep her health information updated, she has authorized us to request the following medical record(s):        (  )  MAMMOGRAM                                      (X)  COLONOSCOPY      (  )  PAP SMEAR                                          (  )  OUTSIDE LAB RESULTS     (  )  DEXA SCAN                                          (  )  EYE EXAM            (  )  FOOT EXAM                                          (  )  ENTIRE RECORD     (  )  OUTSIDE IMMUNIZATIONS                 (  )  _______________         Please fax records to [facility], Watts, Kacie S., MD, [phone]     If you have any questions, please contact Stef GUTIÉRREZ [facility]  Care Coordination Department   [facility]              Patient Name: Ludy Carney  : 1985  Patient Phone #: [phone]

## 2023-09-08 ENCOUNTER — OFFICE VISIT (OUTPATIENT)
Dept: NEPHROLOGY | Facility: CLINIC | Age: 38
End: 2023-09-08
Payer: COMMERCIAL

## 2023-09-08 VITALS
HEART RATE: 88 BPM | HEIGHT: 63 IN | DIASTOLIC BLOOD PRESSURE: 64 MMHG | WEIGHT: 126.13 LBS | OXYGEN SATURATION: 99 % | BODY MASS INDEX: 22.35 KG/M2 | SYSTOLIC BLOOD PRESSURE: 112 MMHG

## 2023-09-08 DIAGNOSIS — I10 PRIMARY HYPERTENSION: Primary | ICD-10-CM

## 2023-09-08 DIAGNOSIS — E83.59 NEPHROCALCINOSIS: ICD-10-CM

## 2023-09-08 DIAGNOSIS — M32.8 OTHER FORMS OF SYSTEMIC LUPUS ERYTHEMATOSUS, UNSPECIFIED ORGAN INVOLVEMENT STATUS: ICD-10-CM

## 2023-09-08 DIAGNOSIS — N29 NEPHROCALCINOSIS: ICD-10-CM

## 2023-09-08 PROCEDURE — 3074F SYST BP LT 130 MM HG: CPT | Mod: CPTII,S$GLB,, | Performed by: INTERNAL MEDICINE

## 2023-09-08 PROCEDURE — 3078F DIAST BP <80 MM HG: CPT | Mod: CPTII,S$GLB,, | Performed by: INTERNAL MEDICINE

## 2023-09-08 PROCEDURE — 3078F PR MOST RECENT DIASTOLIC BLOOD PRESSURE < 80 MM HG: ICD-10-PCS | Mod: CPTII,S$GLB,, | Performed by: INTERNAL MEDICINE

## 2023-09-08 PROCEDURE — 3066F PR DOCUMENTATION OF TREATMENT FOR NEPHROPATHY: ICD-10-PCS | Mod: CPTII,S$GLB,, | Performed by: INTERNAL MEDICINE

## 2023-09-08 PROCEDURE — 3074F PR MOST RECENT SYSTOLIC BLOOD PRESSURE < 130 MM HG: ICD-10-PCS | Mod: CPTII,S$GLB,, | Performed by: INTERNAL MEDICINE

## 2023-09-08 PROCEDURE — 1160F PR REVIEW ALL MEDS BY PRESCRIBER/CLIN PHARMACIST DOCUMENTED: ICD-10-PCS | Mod: CPTII,S$GLB,, | Performed by: INTERNAL MEDICINE

## 2023-09-08 PROCEDURE — 1159F MED LIST DOCD IN RCRD: CPT | Mod: CPTII,S$GLB,, | Performed by: INTERNAL MEDICINE

## 2023-09-08 PROCEDURE — 3066F NEPHROPATHY DOC TX: CPT | Mod: CPTII,S$GLB,, | Performed by: INTERNAL MEDICINE

## 2023-09-08 PROCEDURE — 3008F BODY MASS INDEX DOCD: CPT | Mod: CPTII,S$GLB,, | Performed by: INTERNAL MEDICINE

## 2023-09-08 PROCEDURE — 99214 OFFICE O/P EST MOD 30 MIN: CPT | Mod: S$GLB,,, | Performed by: INTERNAL MEDICINE

## 2023-09-08 PROCEDURE — 99214 PR OFFICE/OUTPT VISIT, EST, LEVL IV, 30-39 MIN: ICD-10-PCS | Mod: S$GLB,,, | Performed by: INTERNAL MEDICINE

## 2023-09-08 PROCEDURE — 1159F PR MEDICATION LIST DOCUMENTED IN MEDICAL RECORD: ICD-10-PCS | Mod: CPTII,S$GLB,, | Performed by: INTERNAL MEDICINE

## 2023-09-08 PROCEDURE — 99999 PR PBB SHADOW E&M-EST. PATIENT-LVL IV: CPT | Mod: PBBFAC,,, | Performed by: INTERNAL MEDICINE

## 2023-09-08 PROCEDURE — 3008F PR BODY MASS INDEX (BMI) DOCUMENTED: ICD-10-PCS | Mod: CPTII,S$GLB,, | Performed by: INTERNAL MEDICINE

## 2023-09-08 PROCEDURE — 99999 PR PBB SHADOW E&M-EST. PATIENT-LVL IV: ICD-10-PCS | Mod: PBBFAC,,, | Performed by: INTERNAL MEDICINE

## 2023-09-08 PROCEDURE — 1160F RVW MEDS BY RX/DR IN RCRD: CPT | Mod: CPTII,S$GLB,, | Performed by: INTERNAL MEDICINE

## 2023-09-08 RX ORDER — CIDER VINEGAR 300 MG
TABLET ORAL
COMMUNITY

## 2023-09-08 RX ORDER — HYDROCHLOROTHIAZIDE 25 MG/1
1 TABLET ORAL DAILY
COMMUNITY
End: 2023-09-08 | Stop reason: ALTCHOICE

## 2023-09-08 RX ORDER — CHLORTHALIDONE 25 MG/1
25 TABLET ORAL DAILY
Qty: 90 TABLET | Refills: 3 | Status: SHIPPED | OUTPATIENT
Start: 2023-09-08 | End: 2024-09-07

## 2023-09-08 RX ORDER — HYDROXYCHLOROQUINE SULFATE 200 MG/1
200 TABLET, FILM COATED ORAL 2 TIMES DAILY
Qty: 60 TABLET | Refills: 11 | Status: SHIPPED | OUTPATIENT
Start: 2023-09-08 | End: 2024-09-07

## 2023-09-08 NOTE — PROGRESS NOTES
"Subjective:       Patient ID: Ludy Carney is a 38 y.o. White female who presents for follow up on Nephrocalcinosis.     Since last seen at nephrology clinic, Ludy Carney denies any new hospitalizations or new medications.   No uremic symptoms, not volume overloaded.    Reports taking their medications on time, without missed doses. As to their chronic conditions:  - Nephrocalcinosis due to Ca hypoxyapetite stones, last 24 hour urine showed good volume, low sodium and low Ur calcium on Chlorthalidone  - Lupus without renal manifestation    Review of Systems   Constitutional:  Negative for appetite change, chills and fever.   HENT:  Negative for congestion.    Eyes:  Negative for visual disturbance.   Respiratory:  Negative for cough and shortness of breath.    Cardiovascular:  Negative for chest pain and leg swelling.   Gastrointestinal:  Negative for abdominal pain, diarrhea, nausea and vomiting.   Genitourinary:  Negative for difficulty urinating, dysuria and hematuria.   Musculoskeletal:  Negative for myalgias.   Skin:  Negative for rash.   Neurological:  Negative for headaches.   Hematological:  Does not bruise/bleed easily.   Psychiatric/Behavioral:  Negative for sleep disturbance.        The past medical, family and social histories were reviewed for this encounter.     /64 (BP Location: Right arm, Patient Position: Sitting, BP Method: Medium (Manual))   Pulse 88   Ht 5' 3" (1.6 m)   Wt 57.2 kg (126 lb 1.7 oz)   LMP 10/05/2016   SpO2 99%   BMI 22.34 kg/m²     Objective:      Physical Exam  Vitals and nursing note reviewed.   Constitutional:       Appearance: Normal appearance.   HENT:      Head: Normocephalic and atraumatic.      Mouth/Throat:      Mouth: Mucous membranes are moist.      Pharynx: Oropharynx is clear.   Eyes:      Extraocular Movements: Extraocular movements intact.      Conjunctiva/sclera: Conjunctivae normal.   Neck:      Vascular: No carotid bruit. "   Cardiovascular:      Rate and Rhythm: Regular rhythm. Tachycardia present.      Heart sounds: Normal heart sounds.   Pulmonary:      Effort: Pulmonary effort is normal. No respiratory distress.      Breath sounds: Normal breath sounds.   Abdominal:      General: Bowel sounds are normal. There is no distension.      Palpations: Abdomen is soft.      Tenderness: There is no abdominal tenderness.   Musculoskeletal:         General: No swelling or tenderness. Normal range of motion.      Cervical back: Normal range of motion. No tenderness.   Lymphadenopathy:      Cervical: No cervical adenopathy.   Skin:     General: Skin is warm and dry.      Capillary Refill: Capillary refill takes less than 2 seconds.      Coloration: Skin is not jaundiced.   Neurological:      General: No focal deficit present.      Mental Status: She is alert and oriented to person, place, and time.   Psychiatric:         Mood and Affect: Mood normal.         Behavior: Behavior normal.         Judgment: Judgment normal.         Assessment:       1. Primary hypertension    2. Nephrocalcinosis    3. Other forms of systemic lupus erythematosus, unspecified organ involvement status        Plan:   Return to clinic in 6 months    Baseline creatinine is 0.7 mg/dL and no proteinuria  Lab Results   Component Value Date    CREATININE 0.7 08/21/2023     Nephrolithiasis   - cont stone prevention   - On diuretics   - 24 hour urine in 12 mo    Lupus    - Q6 mo UAs    DDD   - No NSAIDs    Med changes: none

## 2023-09-12 ENCOUNTER — HOSPITAL ENCOUNTER (OUTPATIENT)
Dept: RADIOLOGY | Facility: HOSPITAL | Age: 38
Discharge: HOME | End: 2023-09-12
Attending: INTERNAL MEDICINE
Payer: COMMERCIAL

## 2023-09-12 DIAGNOSIS — R92.8 ABNORMAL MAMMOGRAM: ICD-10-CM

## 2023-09-12 PROCEDURE — 77065 DX MAMMO INCL CAD UNI: CPT | Mod: 26,LT,, | Performed by: STUDENT IN AN ORGANIZED HEALTH CARE EDUCATION/TRAINING PROGRAM

## 2023-09-12 PROCEDURE — 77065 DX MAMMO INCL CAD UNI: CPT | Mod: TC,PO,LT

## 2023-09-12 PROCEDURE — 77065 MAMMO DIGITAL DIAGNOSTIC LEFT: ICD-10-PCS | Mod: 26,LT,, | Performed by: STUDENT IN AN ORGANIZED HEALTH CARE EDUCATION/TRAINING PROGRAM

## 2023-09-14 DIAGNOSIS — F41.9 ANXIETY AND DEPRESSION: ICD-10-CM

## 2023-09-14 DIAGNOSIS — F32.A ANXIETY AND DEPRESSION: ICD-10-CM

## 2023-09-14 DIAGNOSIS — M25.50 ARTHRALGIA, UNSPECIFIED JOINT: ICD-10-CM

## 2023-09-14 RX ORDER — VENLAFAXINE HYDROCHLORIDE 150 MG/1
CAPSULE, EXTENDED RELEASE ORAL
Qty: 90 CAPSULE | Refills: 3 | Status: SHIPPED | OUTPATIENT
Start: 2023-09-14

## 2023-09-14 NOTE — TELEPHONE ENCOUNTER
Care Due:                  Date            Visit Type   Department     Provider  --------------------------------------------------------------------------------    Last Visit: None Found      None         None Found  Next Visit: None Scheduled  None         None Found                                                            Last  Test          Frequency    Reason                     Performed    Due Date  --------------------------------------------------------------------------------    Office Visit  12 months..  venlafaxine..............  Not Found    Overdue    [employer] Embedded Care Due Messages. Reference number: [id number].   9/14/2023 12:05:51 AM CDT

## 2023-09-14 NOTE — PROGRESS NOTES
Manually uploaded and linked Colon Pathology to . Linked Colonoscopy with Colon Repeat Recommendation to  from media

## 2023-09-14 NOTE — TELEPHONE ENCOUNTER
Refill Routing Note   Medication(s) are not appropriate for processing by [facility] for the following reason(s):      Patient not seen by provider within 15 months    [facility] action(s):  Defer Care Due:  Labs due            Appointments  past 12m or future 3m with PCP    Date Provider   Last Visit   4/29/2021 Lucy Dubose MD   Next Visit   Visit date not found Lucy Dubose MD   ED visits in past 90 days: 0        Note composed:7:38 AM 09/14/2023

## 2023-09-18 DIAGNOSIS — R92.8 ABNORMAL MAMMOGRAM: Primary | ICD-10-CM

## 2023-09-18 NOTE — PROGRESS NOTES
Results have been released via [employer]. Please verify that these have been viewed by patient. If not, please call patient with results.    Please schedule the following orders:  Diagnostic mammogram in 6 mo    I have sent a message to them with the following interpretation (see below).    I have reviewed your recent results.    Mammogram/ultrasound showing fibrous, dense tissue but no concerning findings. Radiology is recommending close follow up just to monitor in 6 months.    Please do not hesitate to call or message with any additional questions or concerns.    Lucy Dubose MD

## 2024-04-11 ENCOUNTER — TELEPHONE (OUTPATIENT)
Dept: NEPHROLOGY | Facility: CLINIC | Age: 39
End: 2024-04-11

## 2024-05-01 ENCOUNTER — OFFICE VISIT (OUTPATIENT)
Dept: NEPHROLOGY | Facility: CLINIC | Age: 39
End: 2024-05-01
Payer: MEDICAID

## 2024-05-01 VITALS — DIASTOLIC BLOOD PRESSURE: 68 MMHG | HEART RATE: 91 BPM | SYSTOLIC BLOOD PRESSURE: 102 MMHG | OXYGEN SATURATION: 98 %

## 2024-05-01 DIAGNOSIS — N29 NEPHROCALCINOSIS: Primary | ICD-10-CM

## 2024-05-01 DIAGNOSIS — I10 PRIMARY HYPERTENSION: ICD-10-CM

## 2024-05-01 DIAGNOSIS — E83.59 NEPHROCALCINOSIS: Primary | ICD-10-CM

## 2024-05-01 DIAGNOSIS — M32.8 OTHER FORMS OF SYSTEMIC LUPUS ERYTHEMATOSUS, UNSPECIFIED ORGAN INVOLVEMENT STATUS: ICD-10-CM

## 2024-05-01 PROCEDURE — 1160F RVW MEDS BY RX/DR IN RCRD: CPT | Mod: CPTII,,, | Performed by: INTERNAL MEDICINE

## 2024-05-01 PROCEDURE — 1159F MED LIST DOCD IN RCRD: CPT | Mod: CPTII,,, | Performed by: INTERNAL MEDICINE

## 2024-05-01 PROCEDURE — 3078F DIAST BP <80 MM HG: CPT | Mod: CPTII,,, | Performed by: INTERNAL MEDICINE

## 2024-05-01 PROCEDURE — 99213 OFFICE O/P EST LOW 20 MIN: CPT | Mod: S$PBB,,, | Performed by: INTERNAL MEDICINE

## 2024-05-01 PROCEDURE — 3066F NEPHROPATHY DOC TX: CPT | Mod: CPTII,,, | Performed by: INTERNAL MEDICINE

## 2024-05-01 PROCEDURE — 99999 PR PBB SHADOW E&M-EST. PATIENT-LVL IV: CPT | Mod: PBBFAC,,, | Performed by: INTERNAL MEDICINE

## 2024-05-01 PROCEDURE — 99214 OFFICE O/P EST MOD 30 MIN: CPT | Mod: PBBFAC,PN | Performed by: INTERNAL MEDICINE

## 2024-05-01 PROCEDURE — 3074F SYST BP LT 130 MM HG: CPT | Mod: CPTII,,, | Performed by: INTERNAL MEDICINE

## 2024-05-01 RX ORDER — ARIPIPRAZOLE 2 MG/1
2 TABLET ORAL
COMMUNITY
Start: 2024-04-09

## 2024-05-01 RX ORDER — BUSPIRONE HYDROCHLORIDE 5 MG/1
5 TABLET ORAL 2 TIMES DAILY
COMMUNITY
Start: 2024-04-09

## 2024-05-01 NOTE — PROGRESS NOTES
Subjective:        Patient ID: Ludy Carney is a 39 y.o. White female who presents for return patient evaluation for chronic renal failure.    No major issues since her last visit. Hasn't passed any stones. She continues to take chlorthalidone and potassium citrate, denies using mobic. Was eating a very strict diet and lost 25 pounds but has since gained some of the weight back.    Review of Systems   Constitutional:  Negative for chills, diaphoresis and fever.   Respiratory:  Negative for cough and shortness of breath.    Cardiovascular:  Negative for chest pain and leg swelling.   Gastrointestinal:  Negative for abdominal pain, diarrhea, nausea and vomiting.   Genitourinary:  Negative for difficulty urinating, dysuria and hematuria.   Musculoskeletal:  Negative for myalgias.   Neurological:  Negative for headaches.   Hematological:  Does not bruise/bleed easily.         The past medical, family and social histories were reviewed for this encounter.     /68 (BP Location: Right arm, Patient Position: Sitting, BP Method: Large (Manual))   Pulse 91   LMP 10/05/2016   SpO2 98%     Objective:      Physical Exam  Vitals reviewed.   Constitutional:       General: She is not in acute distress.     Appearance: She is well-developed.   HENT:      Head: Normocephalic and atraumatic.   Eyes:      General: No scleral icterus.     Conjunctiva/sclera: Conjunctivae normal.   Neck:      Vascular: No JVD.   Cardiovascular:      Rate and Rhythm: Normal rate and regular rhythm.      Heart sounds: Normal heart sounds. No murmur heard.     No friction rub. No gallop.   Pulmonary:      Effort: Pulmonary effort is normal. No respiratory distress.      Breath sounds: Normal breath sounds. No wheezing.   Abdominal:      General: Bowel sounds are normal. There is no distension.      Palpations: Abdomen is soft.      Tenderness: There is no abdominal tenderness.   Musculoskeletal:      Right lower leg: No edema.       "Left lower leg: No edema.   Skin:     General: Skin is warm and dry.      Findings: No rash.   Neurological:      Mental Status: She is alert and oriented to person, place, and time.         Assessment:       1. Nephrocalcinosis    2. Other forms of systemic lupus erythematosus, unspecified organ involvement status    3. Primary hypertension          Lab Results   Component Value Date    CREATININE 0.74 02/09/2024    BUN 10 02/09/2024     02/09/2024    K 4.3 02/09/2024    CL 96 08/21/2023    CO2 29 02/09/2024     Lab Results   Component Value Date    PTH 49.3 04/13/2023    CALCIUM 9.6 02/09/2024    PHOS 3.2 08/21/2023     Lab Results   Component Value Date    HCT 48.6 (H) 04/06/2023     No results found for: "UTPCR"   Plan:   Return to clinic in 6 months.  Labs for next visit include rfp, ua, upc, pth.  Baseline creatinine is 0.7 with no proteinuria.  Nephrocalcinosis due to Ca hypoxyapetite stones, last 24 hour urine showed good volume, low sodium and low Ur calcium on Chlorthalidone. Continue that and potassium citrate. Repeat renal US and 24 urine at next visit.  Lupus without renal manifestation    "

## 2024-05-16 ENCOUNTER — HOSPITAL ENCOUNTER (OUTPATIENT)
Dept: RADIOLOGY | Facility: HOSPITAL | Age: 39
Discharge: HOME | End: 2024-05-16
Attending: INTERNAL MEDICINE
Payer: MEDICAID

## 2024-05-16 DIAGNOSIS — N29 NEPHROCALCINOSIS: ICD-10-CM

## 2024-05-16 DIAGNOSIS — E83.59 NEPHROCALCINOSIS: ICD-10-CM

## 2024-05-16 PROCEDURE — 76770 US EXAM ABDO BACK WALL COMP: CPT | Mod: TC,PO

## 2024-05-16 PROCEDURE — 76770 US EXAM ABDO BACK WALL COMP: CPT | Mod: 26,,, | Performed by: RADIOLOGY

## 2024-07-03 ENCOUNTER — PATIENT MESSAGE (OUTPATIENT)
Dept: NEPHROLOGY | Facility: CLINIC | Age: 39
End: 2024-07-03
Payer: MEDICAID

## 2024-07-03 RX ORDER — POTASSIUM CITRATE 5 MEQ/1
5 TABLET, EXTENDED RELEASE ORAL
Qty: 270 TABLET | Refills: 3 | Status: SHIPPED | OUTPATIENT
Start: 2024-07-03 | End: 2025-07-03

## 2024-08-01 ENCOUNTER — TELEPHONE (OUTPATIENT)
Dept: FAMILY MEDICINE | Facility: CLINIC | Age: 39
End: 2024-08-01
Payer: MEDICAID

## 2024-08-01 DIAGNOSIS — Z96.82 SPINAL CORD NEUROSTIMULATOR DEVICE IN SITU: Primary | ICD-10-CM

## 2024-08-01 NOTE — TELEPHONE ENCOUNTER
----- Message from Tori Glass sent at 8/1/2024  1:24 PM CDT -----  Contact: [phone]  Type:  Patient Returning Call    Who Called:MEDICA[employer]  Who Left Message for Patient:  Does the patient know what this is regarding?:send an referral to a neurology fax [phone]  at the [facility] location / to remove a old spinal cord stimulator  Would the patient rather a call back or a response via [facility]? Call back  Best Call Back Number: [phone]  Additional Information: mrn   [id number]

## 2024-08-01 NOTE — TELEPHONE ENCOUNTER
I have signed for the following orders AND/OR meds.  Please call the patient and ask the patient to schedule the testing AND/OR inform about any medications that were sent. Medications have been sent to pharmacy listed below      Orders Placed This Encounter   Procedures    Ambulatory referral/consult to Neurosurgery     Standing Status:   Future     Standing Expiration Date:   9/1/2025     Referral Priority:   Routine     Referral Type:   Consultation     Referral Reason:   Specialty Services Required     Requested Specialty:   Neurosurgery     Number of Visits Requested:   1              [facility] - [address] [address]  Phone: [phone] Fax: [phone]    [facility] - [place], [place] - [address] [facility] [address]  [address] [facility] [address]  [facility] [address]  Phone: [phone] Fax: [phone]

## 2024-08-01 NOTE — TELEPHONE ENCOUNTER
08/01/2024 1:32 PM   Pt stated she needs a NSGY referral to have an Algovita pain stimulator out. Her previous company [employer] that put the device in is bankrupt and cannot remove it.

## 2024-08-26 ENCOUNTER — PATIENT MESSAGE (OUTPATIENT)
Dept: NEPHROLOGY | Facility: CLINIC | Age: 39
End: 2024-08-26
Payer: MEDICAID

## 2024-08-28 NOTE — ADDENDUM NOTE
Addended by: JEFFREY ARDON on: 4/21/2023 11:13 AM     Modules accepted: Orders     Detail Level: Detailed Depth Of Biopsy: dermis Was A Bandage Applied: Yes Size Of Lesion In Cm: 0 Biopsy Type: H and E Biopsy Method: double edge Personna blade Anesthesia Type: 1% lidocaine with epinephrine and a 1:10 solution of 8.4% sodium bicarbonate Anesthesia Volume In Cc: 0.5 Hemostasis: Drysol Wound Care: Petrolatum Dressing: bandage Destruction After The Procedure: No Type Of Destruction Used: Curettage Curettage Text: The wound bed was treated with curettage after the biopsy was performed. Cryotherapy Text: The wound bed was treated with cryotherapy after the biopsy was performed. Electrodesiccation Text: The wound bed was treated with electrodesiccation after the biopsy was performed. Electrodesiccation And Curettage Text: The wound bed was treated with electrodesiccation and curettage after the biopsy was performed. Silver Nitrate Text: The wound bed was treated with silver nitrate after the biopsy was performed. Lab: [id number] Consent: Verbal consent was obtained and risks were reviewed including but not limited to scarring, infection, bleeding, scabbing, incomplete removal, nerve damage and allergy to anesthesia. Post-Care Instructions: I reviewed with the patient in detail post-care instructions. Patient is to keep the biopsy site dry overnight, and then cleanse daily with soap and water and apply Petrolatum once daily until healed. Notification Instructions: Patient will be notified of biopsy results. However, patient instructed to call the office if not contacted within 2 weeks. Billing Type: Third-Party Bill Information: Selecting Yes will display possible errors in your note based on the variables you have selected. This validation is only offered as a suggestion for you. PLEASE NOTE THAT THE VALIDATION TEXT WILL BE REMOVED WHEN YOU FINALIZE YOUR NOTE. IF YOU WANT TO FAX A PRELIMINARY NOTE YOU WILL NEED TO TOGGLE THIS TO 'NO' IF YOU DO NOT WANT IT IN YOUR FAXED NOTE.

## 2024-08-29 RX ORDER — CHLORTHALIDONE 25 MG/1
25 TABLET ORAL DAILY
Qty: 90 TABLET | Refills: 3 | Status: SHIPPED | OUTPATIENT
Start: 2024-08-29 | End: 2025-08-29

## 2024-11-04 ENCOUNTER — PATIENT MESSAGE (OUTPATIENT)
Dept: GASTROENTEROLOGY | Facility: CLINIC | Age: 39
End: 2024-11-04
Payer: MEDICAID

## 2024-11-04 ENCOUNTER — LAB VISIT (OUTPATIENT)
Dept: LAB | Facility: HOSPITAL | Age: 39
End: 2024-11-04
Attending: INTERNAL MEDICINE
Payer: MEDICAID

## 2024-11-04 ENCOUNTER — PATIENT MESSAGE (OUTPATIENT)
Dept: NEPHROLOGY | Facility: CLINIC | Age: 39
End: 2024-11-04
Payer: MEDICAID

## 2024-11-04 DIAGNOSIS — E83.59 NEPHROCALCINOSIS: ICD-10-CM

## 2024-11-04 DIAGNOSIS — N29 NEPHROCALCINOSIS: ICD-10-CM

## 2024-11-04 LAB
CREAT UR-MCNC: 36 MG/DL (ref 15–325)
PROT UR-MCNC: <7 MG/DL (ref 0–15)
PROT/CREAT UR: NORMAL MG/G{CREAT} (ref 0–0.2)

## 2024-11-04 PROCEDURE — 84156 ASSAY OF PROTEIN URINE: CPT | Performed by: INTERNAL MEDICINE

## 2024-11-11 ENCOUNTER — OFFICE VISIT (OUTPATIENT)
Dept: NEPHROLOGY | Facility: CLINIC | Age: 39
End: 2024-11-11
Payer: MEDICAID

## 2024-11-11 ENCOUNTER — PATIENT MESSAGE (OUTPATIENT)
Dept: NEPHROLOGY | Facility: CLINIC | Age: 39
End: 2024-11-11
Payer: MEDICAID

## 2024-11-11 VITALS
HEART RATE: 86 BPM | BODY MASS INDEX: 22.34 KG/M2 | DIASTOLIC BLOOD PRESSURE: 70 MMHG | SYSTOLIC BLOOD PRESSURE: 110 MMHG | HEIGHT: 63 IN | OXYGEN SATURATION: 98 %

## 2024-11-11 DIAGNOSIS — E21.3 HYPERPARATHYROIDISM, UNSPECIFIED: ICD-10-CM

## 2024-11-11 DIAGNOSIS — N29 NEPHROCALCINOSIS: Primary | ICD-10-CM

## 2024-11-11 DIAGNOSIS — E83.59 NEPHROCALCINOSIS: Primary | ICD-10-CM

## 2024-11-11 DIAGNOSIS — I10 PRIMARY HYPERTENSION: ICD-10-CM

## 2024-11-11 PROCEDURE — 1160F RVW MEDS BY RX/DR IN RCRD: CPT | Mod: CPTII,,, | Performed by: INTERNAL MEDICINE

## 2024-11-11 PROCEDURE — 3008F BODY MASS INDEX DOCD: CPT | Mod: CPTII,,, | Performed by: INTERNAL MEDICINE

## 2024-11-11 PROCEDURE — 3078F DIAST BP <80 MM HG: CPT | Mod: CPTII,,, | Performed by: INTERNAL MEDICINE

## 2024-11-11 PROCEDURE — 1159F MED LIST DOCD IN RCRD: CPT | Mod: CPTII,,, | Performed by: INTERNAL MEDICINE

## 2024-11-11 PROCEDURE — 3074F SYST BP LT 130 MM HG: CPT | Mod: CPTII,,, | Performed by: INTERNAL MEDICINE

## 2024-11-11 PROCEDURE — 99214 OFFICE O/P EST MOD 30 MIN: CPT | Mod: S$PBB,,, | Performed by: INTERNAL MEDICINE

## 2024-11-11 PROCEDURE — 3066F NEPHROPATHY DOC TX: CPT | Mod: CPTII,,, | Performed by: INTERNAL MEDICINE

## 2024-11-11 PROCEDURE — 99999 PR PBB SHADOW E&M-EST. PATIENT-LVL III: CPT | Mod: PBBFAC,,, | Performed by: INTERNAL MEDICINE

## 2024-11-11 PROCEDURE — 99213 OFFICE O/P EST LOW 20 MIN: CPT | Mod: PBBFAC,PN | Performed by: INTERNAL MEDICINE

## 2024-11-11 NOTE — PROGRESS NOTES
"Subjective:        Patient ID: Ludy Carney is a 39 y.o. White female who presents for return patient evaluation for chronic renal failure.    Extreme fatigue, had spinal cord stimulator removed at [place] in September.    Review of Systems   Constitutional:  Negative for chills, diaphoresis and fever.   Respiratory:  Negative for cough and shortness of breath.    Cardiovascular:  Negative for chest pain and leg swelling.   Gastrointestinal:  Negative for abdominal pain, diarrhea, nausea and vomiting.   Genitourinary:  Negative for difficulty urinating, dysuria and hematuria.   Musculoskeletal:  Negative for myalgias.   Neurological:  Negative for headaches.   Hematological:  Does not bruise/bleed easily.         The past medical, family and social histories were reviewed for this encounter.     /70 (BP Location: Left arm, Patient Position: Sitting)   Pulse 86   Ht 5' 3" (1.6 m)   LMP 10/05/2016   SpO2 98%   BMI 22.34 kg/m²     Objective:      Physical Exam  Vitals reviewed.   Constitutional:       General: She is not in acute distress.     Appearance: She is well-developed.   HENT:      Head: Normocephalic and atraumatic.   Eyes:      General: No scleral icterus.     Conjunctiva/sclera: Conjunctivae normal.   Neck:      Vascular: No JVD.   Cardiovascular:      Rate and Rhythm: Normal rate and regular rhythm.      Heart sounds: Normal heart sounds. No murmur heard.     No friction rub. No gallop.   Pulmonary:      Effort: Pulmonary effort is normal. No respiratory distress.      Breath sounds: Normal breath sounds. No wheezing.   Abdominal:      General: Bowel sounds are normal. There is no distension.      Palpations: Abdomen is soft.      Tenderness: There is no abdominal tenderness.   Musculoskeletal:      Right lower leg: No edema.      Left lower leg: No edema.   Skin:     General: Skin is warm and dry.      Findings: No rash.   Neurological:      Mental Status: She is alert and " oriented to person, place, and time.         Assessment:       1. Nephrocalcinosis    2. Primary hypertension    3. Hyperparathyroidism, unspecified            Lab Results   Component Value Date    CREATININE 0.7 11/04/2024    BUN 11 11/04/2024     11/04/2024    K 4.7 11/04/2024     11/04/2024    CO2 25 11/04/2024     Lab Results   Component Value Date    PTH 84.6 (H) 11/04/2024    CALCIUM 9.4 11/04/2024    PHOS 3.5 11/04/2024     Lab Results   Component Value Date    HCT 48.6 (H) 04/06/2023     Prot/Creat Ratio, Urine   Date Value Ref Range Status   11/04/2024 Unable to calculate 0.00 - 0.20 Final      Plan:   Return to clinic in 6 months.  Labs for next visit include rfp, ua, upc, pth.  Baseline creatinine is 0.7 with no proteinuria.  Nephrocalcinosis due to Ca hypoxyapetite stones, last 24 hour urine showed good volume, low sodium and low Ur calcium on Chlorthalidone. Continue that and potassium citrate.   PTH 85, Ca 9.4. Discussed elevation in PTH likely due to vitamin D deficiency but do not recommend at this time due to nephrocalcinosis.  Lupus without renal manifestation

## 2025-04-09 ENCOUNTER — TELEPHONE (OUTPATIENT)
Dept: PRIMARY CARE CLINIC | Facility: CLINIC | Age: 40
End: 2025-04-09
Payer: MEDICAID

## 2025-04-09 NOTE — TELEPHONE ENCOUNTER
----- Message from Tova sent at 4/9/2025  8:41 AM CDT -----  Contact: QASIM MARTE [[id number]]  .Type:  Patient Requesting CallWho Called:QASIM MARTE [[id number]]Does the patient know what this is regarding?:Patient requesting a call back to schedule an appointment. Patient states she has reached out via [employer] but hasn't gotten a response.Would the patient rather a call back or a response via [facility]? Call [facility] Call Back Number:.[phone] (home)  Additional Information:

## 2025-04-14 ENCOUNTER — OFFICE VISIT (OUTPATIENT)
Dept: FAMILY MEDICINE | Facility: CLINIC | Age: 40
End: 2025-04-14
Payer: MEDICAID

## 2025-04-14 ENCOUNTER — LAB VISIT (OUTPATIENT)
Dept: LAB | Facility: HOSPITAL | Age: 40
End: 2025-04-14
Attending: INTERNAL MEDICINE
Payer: MEDICAID

## 2025-04-14 VITALS
TEMPERATURE: 99 F | WEIGHT: 146 LBS | BODY MASS INDEX: 24.92 KG/M2 | SYSTOLIC BLOOD PRESSURE: 124 MMHG | HEART RATE: 88 BPM | DIASTOLIC BLOOD PRESSURE: 81 MMHG | HEIGHT: 64 IN

## 2025-04-14 DIAGNOSIS — Z13.220 ENCOUNTER FOR LIPID SCREENING FOR CARDIOVASCULAR DISEASE: ICD-10-CM

## 2025-04-14 DIAGNOSIS — Z79.899 ENCOUNTER FOR LONG-TERM (CURRENT) USE OF HIGH-RISK MEDICATION: ICD-10-CM

## 2025-04-14 DIAGNOSIS — G47.33 OSA ON CPAP: ICD-10-CM

## 2025-04-14 DIAGNOSIS — Z00.00 ENCOUNTER FOR ANNUAL HEALTH EXAMINATION: Primary | ICD-10-CM

## 2025-04-14 DIAGNOSIS — I10 PRIMARY HYPERTENSION: ICD-10-CM

## 2025-04-14 DIAGNOSIS — Z13.1 SCREENING FOR DIABETES MELLITUS: ICD-10-CM

## 2025-04-14 DIAGNOSIS — Z13.6 ENCOUNTER FOR LIPID SCREENING FOR CARDIOVASCULAR DISEASE: ICD-10-CM

## 2025-04-14 DIAGNOSIS — Z00.00 ENCOUNTER FOR ANNUAL HEALTH EXAMINATION: ICD-10-CM

## 2025-04-14 DIAGNOSIS — F32.A ANXIETY AND DEPRESSION: ICD-10-CM

## 2025-04-14 DIAGNOSIS — F41.9 ANXIETY AND DEPRESSION: ICD-10-CM

## 2025-04-14 DIAGNOSIS — M32.8 OTHER FORMS OF SYSTEMIC LUPUS ERYTHEMATOSUS, UNSPECIFIED ORGAN INVOLVEMENT STATUS: ICD-10-CM

## 2025-04-14 DIAGNOSIS — N29 NEPHROCALCINOSIS: ICD-10-CM

## 2025-04-14 DIAGNOSIS — E16.2 HYPOGLYCEMIA: ICD-10-CM

## 2025-04-14 DIAGNOSIS — E83.59 NEPHROCALCINOSIS: ICD-10-CM

## 2025-04-14 DIAGNOSIS — R53.83 FATIGUE, UNSPECIFIED TYPE: ICD-10-CM

## 2025-04-14 PROBLEM — B34.9 VIRAL ILLNESS: Status: RESOLVED | Noted: 2020-04-08 | Resolved: 2025-04-14

## 2025-04-14 PROBLEM — Z01.818 PRE-OP TESTING: Status: RESOLVED | Noted: 2020-10-15 | Resolved: 2025-04-14

## 2025-04-14 PROBLEM — J06.9 VIRAL UPPER RESPIRATORY TRACT INFECTION: Status: RESOLVED | Noted: 2022-06-27 | Resolved: 2025-04-14

## 2025-04-14 LAB
ABSOLUTE EOSINOPHIL (OHS): 0.06 K/UL
ABSOLUTE MONOCYTE (OHS): 0.47 K/UL (ref 0.3–1)
ABSOLUTE NEUTROPHIL COUNT (OHS): 4.56 K/UL (ref 1.8–7.7)
ALBUMIN SERPL BCP-MCNC: 4.2 G/DL (ref 3.5–5.2)
ALP SERPL-CCNC: 63 UNIT/L (ref 40–150)
ALT SERPL W/O P-5'-P-CCNC: 14 UNIT/L (ref 10–44)
ANION GAP (OHS): 12 MMOL/L (ref 8–16)
AST SERPL-CCNC: 15 UNIT/L (ref 11–45)
BASOPHILS # BLD AUTO: 0.07 K/UL
BASOPHILS NFR BLD AUTO: 0.9 %
BILIRUB SERPL-MCNC: 0.2 MG/DL (ref 0.1–1)
BUN SERPL-MCNC: 12 MG/DL (ref 6–20)
CALCIUM SERPL-MCNC: 9.5 MG/DL (ref 8.7–10.5)
CHLORIDE SERPL-SCNC: 100 MMOL/L (ref 95–110)
CHOLEST SERPL-MCNC: 228 MG/DL (ref 120–199)
CHOLEST/HDLC SERPL: 4.2 {RATIO} (ref 2–5)
CO2 SERPL-SCNC: 28 MMOL/L (ref 23–29)
CORTIS SERPL-MCNC: 5.8 UG/DL (ref 4.3–22.4)
CREAT SERPL-MCNC: 0.7 MG/DL (ref 0.5–1.4)
EAG (OHS): 94 MG/DL (ref 68–131)
ERYTHROCYTE [DISTWIDTH] IN BLOOD BY AUTOMATED COUNT: 12.2 % (ref 11.5–14.5)
GFR SERPLBLD CREATININE-BSD FMLA CKD-EPI: >60 ML/MIN/1.73/M2
GLUCOSE SERPL-MCNC: 84 MG/DL (ref 70–110)
HBA1C MFR BLD: 4.9 % (ref 4–5.6)
HCT VFR BLD AUTO: 45.2 % (ref 37–48.5)
HDLC SERPL-MCNC: 54 MG/DL (ref 40–75)
HDLC SERPL: 23.7 % (ref 20–50)
HGB BLD-MCNC: 14.7 GM/DL (ref 12–16)
IMM GRANULOCYTES # BLD AUTO: 0.03 K/UL (ref 0–0.04)
IMM GRANULOCYTES NFR BLD AUTO: 0.4 % (ref 0–0.5)
LDLC SERPL CALC-MCNC: 129.4 MG/DL (ref 63–159)
LYMPHOCYTES # BLD AUTO: 2.42 K/UL (ref 1–4.8)
MAGNESIUM SERPL-MCNC: 2.1 MG/DL (ref 1.6–2.6)
MCH RBC QN AUTO: 28.9 PG (ref 27–31)
MCHC RBC AUTO-ENTMCNC: 32.5 G/DL (ref 32–36)
MCV RBC AUTO: 89 FL (ref 82–98)
NONHDLC SERPL-MCNC: 174 MG/DL
NUCLEATED RBC (/100WBC) (OHS): 0 /100 WBC
PLATELET # BLD AUTO: 288 K/UL (ref 150–450)
PMV BLD AUTO: 10.2 FL (ref 9.2–12.9)
POTASSIUM SERPL-SCNC: 4.2 MMOL/L (ref 3.5–5.1)
PROT SERPL-MCNC: 7.5 GM/DL (ref 6–8.4)
RBC # BLD AUTO: 5.08 M/UL (ref 4–5.4)
RELATIVE EOSINOPHIL (OHS): 0.8 %
RELATIVE LYMPHOCYTE (OHS): 31.8 % (ref 18–48)
RELATIVE MONOCYTE (OHS): 6.2 % (ref 4–15)
RELATIVE NEUTROPHIL (OHS): 59.9 % (ref 38–73)
SODIUM SERPL-SCNC: 140 MMOL/L (ref 136–145)
TRIGL SERPL-MCNC: 223 MG/DL (ref 30–150)
TSH SERPL-ACNC: 0.97 UIU/ML (ref 0.4–4)
WBC # BLD AUTO: 7.61 K/UL (ref 3.9–12.7)

## 2025-04-14 PROCEDURE — 84443 ASSAY THYROID STIM HORMONE: CPT

## 2025-04-14 PROCEDURE — 82533 TOTAL CORTISOL: CPT

## 2025-04-14 PROCEDURE — 3079F DIAST BP 80-89 MM HG: CPT | Mod: CPTII,,, | Performed by: INTERNAL MEDICINE

## 2025-04-14 PROCEDURE — 99999 PR PBB SHADOW E&M-EST. PATIENT-LVL IV: CPT | Mod: PBBFAC,,, | Performed by: INTERNAL MEDICINE

## 2025-04-14 PROCEDURE — 85025 COMPLETE CBC W/AUTO DIFF WBC: CPT

## 2025-04-14 PROCEDURE — 80061 LIPID PANEL: CPT

## 2025-04-14 PROCEDURE — 99396 PREV VISIT EST AGE 40-64: CPT | Mod: S$PBB,,, | Performed by: INTERNAL MEDICINE

## 2025-04-14 PROCEDURE — 36415 COLL VENOUS BLD VENIPUNCTURE: CPT | Mod: PO

## 2025-04-14 PROCEDURE — 3074F SYST BP LT 130 MM HG: CPT | Mod: CPTII,,, | Performed by: INTERNAL MEDICINE

## 2025-04-14 PROCEDURE — 83036 HEMOGLOBIN GLYCOSYLATED A1C: CPT

## 2025-04-14 PROCEDURE — 1159F MED LIST DOCD IN RCRD: CPT | Mod: CPTII,,, | Performed by: INTERNAL MEDICINE

## 2025-04-14 PROCEDURE — 83735 ASSAY OF MAGNESIUM: CPT

## 2025-04-14 PROCEDURE — 3008F BODY MASS INDEX DOCD: CPT | Mod: CPTII,,, | Performed by: INTERNAL MEDICINE

## 2025-04-14 PROCEDURE — 80053 COMPREHEN METABOLIC PANEL: CPT

## 2025-04-14 PROCEDURE — 99214 OFFICE O/P EST MOD 30 MIN: CPT | Mod: PBBFAC,PO | Performed by: INTERNAL MEDICINE

## 2025-04-14 RX ORDER — BUSPIRONE HYDROCHLORIDE 5 MG/1
5 TABLET ORAL 2 TIMES DAILY
Qty: 180 TABLET | Refills: 0 | Status: SHIPPED | OUTPATIENT
Start: 2025-04-14

## 2025-04-14 RX ORDER — EPINEPHRINE 0.3 MG/.3ML
1 INJECTION SUBCUTANEOUS
Qty: 1 EACH | Refills: 0 | Status: SHIPPED | OUTPATIENT
Start: 2025-04-14

## 2025-04-14 RX ORDER — HYDROXYCHLOROQUINE SULFATE 200 MG/1
400 TABLET, FILM COATED ORAL DAILY
COMMUNITY

## 2025-04-14 RX ORDER — INSULIN PUMP SYRINGE, 3 ML
EACH MISCELLANEOUS
Qty: 1 EACH | Refills: 0 | Status: SHIPPED | OUTPATIENT
Start: 2025-04-14

## 2025-04-14 RX ORDER — KETOCONAZOLE 20 MG/ML
SHAMPOO, SUSPENSION TOPICAL WEEKLY
Qty: 120 ML | Refills: 0 | Status: SHIPPED | OUTPATIENT
Start: 2025-04-14

## 2025-04-14 RX ORDER — VENLAFAXINE HYDROCHLORIDE 150 MG/1
150 CAPSULE, EXTENDED RELEASE ORAL DAILY
Qty: 90 CAPSULE | Refills: 3 | Status: SHIPPED | OUTPATIENT
Start: 2025-04-14

## 2025-04-14 RX ORDER — LANCETS
1 EACH MISCELLANEOUS DAILY PRN
Qty: 100 EACH | Refills: 0 | Status: SHIPPED | OUTPATIENT
Start: 2025-04-14

## 2025-04-14 NOTE — PROGRESS NOTES
Assessment/Plan:    Patient here for annual wellness exam. Health maintenance was reviewed and ordered.    Complete history and physical was completed today.  Complete and thorough medication reconciliation was performed.  Discussed risks and benefits of medications.  Advised patient on orders and health maintenance. Continue current medications listed on your summary sheet.    All questions were answered. Patient had no further concerns. Advised of diagnoses and plan, as listed below.    1. Encounter for annual health examination  -     CBC Auto Differential; Future; Expected date: 04/14/2025  -     TSH; Future; Expected date: 04/14/2025    2. Anxiety and depression  Overview:  -chronic  -previously followed by psychiatry but not currently seeing anyone  -reports symptoms stable with Effexor 150 mg QD and Buspar 5 mg BID  -denies AE of the medication  -plan to continue current treatment        Orders:  -     venlafaxine (EFFEXOR-XR) 150 MG Cp24; Take 1 capsule (150 mg total) by mouth once daily.  Dispense: 90 capsule; Refill: 3  -     busPIRone (BUSPAR) 5 MG Tab; Take 1 tablet (5 mg total) by mouth 2 (two) times daily.  Dispense: 180 tablet; Refill: 0    3. Nephrocalcinosis  Overview:  -chronic  -followed regularly by nephrology      4. Encounter for lipid screening for cardiovascular disease  -     Lipid Panel; Standing    5. Hypoglycemia  -     Hemoglobin A1C; Future; Expected date: 04/14/2025  -     blood sugar diagnostic (BLOOD GLUCOSE TEST) Strp; Use 1-2 x a day to check glucoses before meals.  Dispense: 100 strip; Refill: 0  -     blood-glucose meter kit; Use as instructed  Dispense: 1 each; Refill: 0  -     lancets Misc; 1 Units by Misc.(Non-Drug; Combo Route) route daily as needed.  Dispense: 100 each; Refill: 0  -     Cortisol, 8AM; Future; Expected date: 04/14/2025    6. Screening for diabetes mellitus  -     Hemoglobin A1C; Future; Expected date: 04/14/2025    7. Fatigue, unspecified type    8. Encounter  for long-term (current) use of high-risk medication  -     CBC Auto Differential; Future; Expected date: 04/14/2025  -     TSH; Future; Expected date: 04/14/2025  -     Comprehensive Metabolic Panel; Future; Expected date: 04/14/2025  -     Magnesium; Future; Expected date: 04/14/2025    9. ANIRUDH on CPAP  Overview:  -using CPAP nightly      10. Primary hypertension  Overview:  Hypertension Medications              chlorthalidone (HYGROTEN) 25 MG Tab Take 1 tablet (25 mg total) by mouth once daily.     -at goal today  -continue lifestyle modification with low sodium diet and exercise   -discussed hypertension disease course and importance of treating high blood pressure  -patient understood and advised of risk of untreated blood pressure.  ER precautions were given   for symptoms of hypertensive urgency and emergency.       11. Other forms of systemic lupus erythematosus, unspecified organ involvement status  Overview:  -reports symptoms, when present, include generalized joint pain and stiffness, livedo reticularis rash, severe fatigue  -management by rheumatology  -currently on colchicine 0.6 mg BID, prednisone 20 mg QD PRN, plaquenil 200 mg BID and cellcept  -she discontinued plaquenil on her own last month due to concern for hypoglycemia  -checking fasting labs today and will provide with glucometer for home testing  -no recurrence of symptoms in question since stopping and patient reports lupus symptoms remain stable  -she will discuss further with her rheumatologist at follow up appt next month        Other orders  -     ketoconazole (NIZORAL) 2 % shampoo; Apply topically once a week.  Dispense: 120 mL; Refill: 0  -     EPINEPHrine (EPIPEN) 0.3 mg/0.3 mL AtIn; Inject 0.3 mLs (0.3 mg total) into the muscle as needed (allergic reaction).  Dispense: 1 each; Refill: 0        Follow up if symptoms worsen or fail to improve.    Lucy Dubose MD       WELLNESS EXAM      Patient ID: Ludy Carney is a 40  y.o. female.  has a past medical history of Anxiety, Aortitis (05/23/2016), Arteritis, Bone cyst, Chronic kidney disease, unspecified, DDD (degenerative disc disease), cervical, DDD (degenerative disc disease), lumbar, Endometriosis, Fractures, Heart palpitations, History of lumbar spinal fusion, Osteoarthritis, Pain, pelvic, female, PTSD (post-traumatic stress disorder), and Renal disorder.     Chief Complaint:  Encounter for wellness exam    History of Present Illness  The patient presents for an annual exam.    She continues to consult with her rheumatologist at [facility] and is under the care of a nephrologist. She is scheduled to see Dr. Duran, her rheumatologist, within the next few weeks. She has been on Plaquenil for an extended period but discontinued it due to concerns about potential side effects, including potential hypoglycemia. She has not experienced any joint or skin changes since discontinuing Plaquenil. She has been experiencing daytime fatigue, but reports that this is not a new symptoms and has been ongoing for years. She was diagnosed with sleep apnea and has been using a CPAP machine for the past 7 months, which has resulted in some improvement in her symptom, although still experiencing daytime somnolence.    She reports a history of low blood sugar in the past, characterized by intermittent shaking episodes. Approximately a month ago, she experienced a severe episode of shaking and near syncope. This episode occurred around 10 am after consuming coffee without food intake. She reports being aware of her surroundings the entire time but unable to respond to questions and like things were occurring in slow motion. She was given some chips to eat during episode and after consuming these, she reports symptoms improved. She also reports a similar episode in her 20s during a workout session, which was accompanied by nausea and weakness. She has not introduced any new medications or supplements  recently.    She is currently on BuSpar, which she takes daily, as well as Effexor. She reports that her symptoms are well-managed with these medications.    Supplemental Information  She had a spinal cord stimulator removed.    No other new complaints today.  Remaining chronic conditions have been reviewed and remain stable. Further detail as stated above.       Health Maintenance Topics with due status: Not Due       Topic Last Completion Date    Mammogram 2025    RSV Vaccine (Age 60+ and Pregnant patients) Not Due        Past Medical History:  Past Medical History:   Diagnosis Date    Anxiety     Aortitis 2016    Arteritis         Bone cyst     In back    Chronic kidney disease, unspecified     DDD (degenerative disc disease), cervical     DDD (degenerative disc disease), lumbar     Endometriosis     Fractures     Finger and hairline fracture in back    Heart palpitations     History of lumbar spinal fusion     Osteoarthritis     ?    Pain, pelvic, female     PTSD (post-traumatic stress disorder)     Renal disorder     kidney stones      Past Surgical History:   Procedure Laterality Date    ABDOMINAL SURGERY  10/16/2020    Hysterectomy and detach of intestines from abdominal wall    APPENDECTOMY      BACK SURGERY  2018    Nerve stimulator put in lower back    BILATERAL SALPINGO-OOPHORECTOMY (BSO) Bilateral 10/15/2020    Procedure: SALPINGO-OOPHORECTOMY, BILATERAL;  Surgeon: Zahira Memberno MD;  Location: [facility] OR;  Service: OB/GYN;  Laterality: Bilateral;     SECTION, CLASSIC      x 3    DILATION AND CURETTAGE OF UTERUS      x 2    EYE SURGERY      HYSTERECTOMY  10/15/2016    IMPLANTATION OF PERMANENT SACRAL NERVE STIMULATOR Left     since 2017, Dr Jared shah     LAPAROSCOPIC SALPINGO-OOPHORECTOMY Bilateral 10/15/2020    Procedure: SALPINGO-OOPHORECTOMY, LAPAROSCOPIC-attempted;  Surgeon: Zahira Membreno MD;  Location: [facility] OR;  Service: OB/GYN;   Laterality: Bilateral;    LUMBAR EPIDURAL INJECTION      multiple times over last eight yrs    LUMBAR FUSION  12/31/2015    LYSIS OF ADHESIONS N/A 10/15/2020    Procedure: LYSIS, ADHESIONS;  Surgeon: Zahira Membreno MD;  Location: [facility] OR;  Service: OB/GYN;  Laterality: N/A;    PELVIC LAPAROSCOPY      for endometriosis    RHIZOTOMY W/ RADIOFREQUENCY ABLATION      several ( back and neck ) over past 8 yrs    SPINAL FUSION  2016    L4-S1 fusion    tonsillectomy      TONSILLECTOMY       Review of patient's allergies indicates:   Allergen Reactions    Adhesive Blisters, Dermatitis, Hives, Itching and Swelling    Imitrex [sumatriptan] Hallucinations    Venom-wasp     Bee venom protein (honey bee)     Eggs [egg derived] Diarrhea     And nausea    Norelgestromin-ethin.estradiol Other (See Comments) and Rash     Chest pains     Medications Ordered Prior to Encounter[1]  Social History[2]  Family History   Problem Relation Name Age of Onset    Hypertension Mother Breanne Holguin     Heart disease Mother Breanne Holguin     Hyperlipidemia Mother Breanne Holguin     Arthritis Mother Breanne Holguin     Depression Mother Breanne Holguin     Broken bones Mother Breanne Holguin     Depression Father Mayur     Suicide Father Mayur 32    Early death Father Mayur     Mental illness Father Mayur     Depression Sister Leesa     Learning disabilities Sister Leesa     No Known Problems Brother      No Known Problems Brother      Cancer Maternal Grandfather Gerard Rossy     Kidney disease Maternal Grandmother Floy        Review of Systems   Constitutional:  Positive for malaise/fatigue. Negative for chills and fever.   HENT:  Negative for congestion and sore throat.    Eyes:  Negative for blurred vision and double vision.   Respiratory:  Negative for cough and shortness of breath.    Cardiovascular:  Negative for chest pain, palpitations and leg swelling.   Gastrointestinal:  Negative for abdominal pain, constipation and  diarrhea.   Genitourinary:  Negative for dysuria and frequency.   Musculoskeletal:  Negative for back pain and joint pain.   Neurological:  Positive for dizziness. Negative for headaches.   Psychiatric/Behavioral:  Negative for depression. The patient is not nervous/anxious.          Objective:      Vitals:    04/14/25 0835   BP: 124/81   Pulse: 88   Temp: 98.5 °F (36.9 °C)     Body mass index is 25.06 kg/m².     Physical Exam  Constitutional:       General: She is not in acute distress.     Appearance: Normal appearance. She is well-developed.   HENT:      Head: Normocephalic and atraumatic.   Eyes:      Extraocular Movements: Extraocular movements intact.      Conjunctiva/sclera: Conjunctivae normal.      Pupils: Pupils are equal, round, and reactive to light.   Cardiovascular:      Rate and Rhythm: Normal rate and regular rhythm.      Pulses: Normal pulses.      Heart sounds: Normal heart sounds. No murmur heard.  Pulmonary:      Effort: Pulmonary effort is normal. No respiratory distress.      Breath sounds: Normal breath sounds.   Abdominal:      General: Bowel sounds are normal. There is no distension.      Palpations: Abdomen is soft.      Tenderness: There is no abdominal tenderness.   Musculoskeletal:         General: Normal range of motion.      Cervical back: Normal range of motion and neck supple.   Skin:     General: Skin is warm and dry.      Findings: No rash.   Neurological:      General: No focal deficit present.      Mental Status: She is alert and oriented to person, place, and time.      Cranial Nerves: No cranial nerve deficit.      Sensory: No sensory deficit.      Motor: No weakness.      Coordination: Coordination normal.      Gait: Gait normal.      Deep Tendon Reflexes: Reflexes normal.   Psychiatric:         Mood and Affect: Mood normal.         Behavior: Behavior normal.         All labs, imaging and procedures performed since last visit have been personally reviewed.    DISCLAIMER: This  note was compiled by using a speech recognition dictation system and therefore please be aware that typographical / speech recognition errors can and do occur.  Please contact me if you see any errors specifically.  Consent was obtained for [facility] recording system prior to the visit.         [1]  Current Outpatient Medications on File Prior to Visit   Medication Sig Dispense Refill    apple cider vinegar 300 mg Tab       chlorthalidone (HYGROTEN) 25 MG Tab Take 1 tablet (25 mg total) by mouth once daily. 90 tablet 3    clobetasoL (TEMOVATE) 0.05 % external solution SMARTSI Milliliter(s) Topical Twice Daily      colchicine (COLCRYS) 0.6 mg tablet Take 1 tablet by mouth 2 (two) times daily.      estradioL (ESTRACE) 2 MG tablet Take 2 mg by mouth once daily.      fluticasone (FLONASE) 50 mcg/actuation nasal spray as needed.       hydroxychloroquine (PLAQUENIL) 200 mg tablet Take 400 mg by mouth once daily.      magnesium oxide (MAG-OX) 400 mg (241.3 mg magnesium) tablet Take 400 mg by mouth.      mycophenolate (CELLCEPT) 500 mg Tab Take 1,000 mg by mouth 2 (two) times daily.      omeprazole (PRILOSEC) 40 MG capsule Take 40 mg by mouth nightly.      potassium citrate (UROCIT-K) 5 mEq (540 mg) TbSR Take 1 tablet (5 mEq total) by mouth 3 (three) times daily with meals. 270 tablet 3    predniSONE (DELTASONE) 20 MG tablet Take 20 mg by mouth. prn      [DISCONTINUED] busPIRone (BUSPAR) 5 MG Tab Take 5 mg by mouth 2 (two) times daily.      [DISCONTINUED] ketoconazole (NIZORAL) 2 % shampoo SMARTSIG:Topical 2-3 Times Weekly      [DISCONTINUED] venlafaxine (EFFEXOR-XR) 150 MG Cp24 TAKE 1 CAPSULE DAILY 90 capsule 3    [DISCONTINUED] ARIPiprazole (ABILIFY) 2 MG Tab Take 2 mg by mouth. (Patient not taking: Reported on 2025)      [DISCONTINUED] EPINEPHrine (EPIPEN) 0.3 mg/0.3 mL AtIn Inject 0.3 mg into the muscle as needed.  (Patient not taking: Reported on 2025)      [DISCONTINUED] nabumetone (RELAFEN)  750 MG tablet Take 750 mg by mouth 2 (two) times daily. (Patient not taking: Reported on 5/1/2024)       Current Facility-Administered Medications on File Prior to Visit   Medication Dose Route Frequency Provider Last Rate Last Admin    lactated ringers infusion   Intravenous Continuous Zahira Membreno MD   New Bag at 10/15/20 0759    lorazepam injection 0.25 mg  0.25 mg Intravenous Q15 Min PRN Volpi-Abadie, Jacqueline, MD   0.25 mg at 10/15/20 0905    ondansetron injection 4 mg  4 mg Intravenous Daily PRN Volpi-Abadie, Jacqueline, MD        oxyCODONE-acetaminophen 5-325 mg per tablet 1 tablet  1 tablet Oral Q3H PRN Volpi-Abadie, Jacqueline, MD   1 tablet at 10/16/20 0817    sodium chloride 0.9% flush 3 mL  3 mL Intravenous PRN Volpi-Abadie, Jacqueline, MD       [2]  Social History  Socioeconomic History    Marital status:    Tobacco Use    Smoking status: Never    Smokeless tobacco: Never   Substance and Sexual Activity    Alcohol use: No    Drug use: No    Sexual activity: Yes     Partners: Male     Birth control/protection: See Surgical Hx     Social Drivers of Health     Financial Resource Strain: Patient Declined (4/14/2025)    Overall Financial Resource Strain (CARDIA)     Difficulty of Paying Living Expenses: Patient declined   Food Insecurity: Patient Declined (4/14/2025)    Hunger Vital Sign     Worried About Running Out of Food in the Last Year: Patient declined     Ran Out of Food in the Last Year: Patient declined   Transportation Needs: No Transportation Needs (4/14/2025)    PRAPARE - Transportation     Lack of Transportation (Medical): No     Lack of Transportation (Non-Medical): No   Physical Activity: Insufficiently Active (4/14/2025)    Exercise Vital Sign     Days of Exercise per Week: 5 days     Minutes of Exercise per Session: 20 min   Stress: Stress Concern Present (4/14/2025)    [employer] - Occupational Stress Questionnaire     Feeling  of Stress : Rather much   Housing Stability: Unknown (4/14/2025)    Housing Stability Vital Sign     Unable to Pay for Housing in the Last Year: Patient declined     Number of Times Moved in the Last Year: 0     Homeless in the Last Year: No

## 2025-05-15 ENCOUNTER — LAB VISIT (OUTPATIENT)
Dept: LAB | Facility: HOSPITAL | Age: 40
End: 2025-05-15
Attending: INTERNAL MEDICINE
Payer: MEDICAID

## 2025-05-15 DIAGNOSIS — N29 NEPHROCALCINOSIS: ICD-10-CM

## 2025-05-15 DIAGNOSIS — E83.59 NEPHROCALCINOSIS: ICD-10-CM

## 2025-05-15 LAB
ALBUMIN SERPL BCP-MCNC: 3.7 G/DL (ref 3.5–5.2)
ANION GAP (OHS): 10 MMOL/L (ref 8–16)
BUN SERPL-MCNC: 10 MG/DL (ref 6–20)
CALCIUM SERPL-MCNC: 9 MG/DL (ref 8.7–10.5)
CHLORIDE SERPL-SCNC: 103 MMOL/L (ref 95–110)
CO2 SERPL-SCNC: 28 MMOL/L (ref 23–29)
CREAT SERPL-MCNC: 0.7 MG/DL (ref 0.5–1.4)
CREAT UR-MCNC: 67 MG/DL (ref 15–325)
GFR SERPLBLD CREATININE-BSD FMLA CKD-EPI: >60 ML/MIN/1.73/M2
GLUCOSE SERPL-MCNC: 90 MG/DL (ref 70–110)
PHOSPHATE SERPL-MCNC: 2.9 MG/DL (ref 2.7–4.5)
POTASSIUM SERPL-SCNC: 4.3 MMOL/L (ref 3.5–5.1)
PROT UR-MCNC: <7 MG/DL
PROT/CREAT UR: NORMAL MG/G{CREAT}
PTH-INTACT SERPL-MCNC: 63.8 PG/ML (ref 9–77)
SODIUM SERPL-SCNC: 141 MMOL/L (ref 136–145)

## 2025-05-15 PROCEDURE — 83970 ASSAY OF PARATHORMONE: CPT

## 2025-05-15 PROCEDURE — 82040 ASSAY OF SERUM ALBUMIN: CPT

## 2025-05-15 PROCEDURE — 84156 ASSAY OF PROTEIN URINE: CPT

## 2025-05-15 PROCEDURE — 36415 COLL VENOUS BLD VENIPUNCTURE: CPT | Mod: PO

## 2025-05-19 ENCOUNTER — TELEPHONE (OUTPATIENT)
Dept: NEPHROLOGY | Facility: CLINIC | Age: 40
End: 2025-05-19
Payer: MEDICAID

## 2025-05-19 NOTE — TELEPHONE ENCOUNTER
Called and spoke to patient to reschedule appointment. New appointment made for 05/26/25 at 0920.

## 2025-08-05 DIAGNOSIS — F41.9 ANXIETY AND DEPRESSION: ICD-10-CM

## 2025-08-05 DIAGNOSIS — F32.A ANXIETY AND DEPRESSION: ICD-10-CM

## 2025-08-05 NOTE — TELEPHONE ENCOUNTER
No care due was identified.  [facility] Embedded Care Due Messages. Reference number: [id number].   8/05/2025 3:59:55 PM CDT

## 2025-08-05 NOTE — TELEPHONE ENCOUNTER
Refill Routing Note   Medication(s) are not appropriate for processing by [facility] for the following reason(s):        Outside of protocol    [facility] action(s):  Route               Appointments  past 12m or future 3m with PCP    Date Provider   Last Visit   4/14/2025 Lucy Dubose MD   Next Visit   Visit date not found Lucy Dubose MD   ED visits in past 90 days: 0        Note composed:4:24 PM 08/05/2025

## 2025-08-06 RX ORDER — BUSPIRONE HYDROCHLORIDE 5 MG/1
5 TABLET ORAL 2 TIMES DAILY
Qty: 180 TABLET | Refills: 3 | Status: SHIPPED | OUTPATIENT
Start: 2025-08-06

## 2025-09-03 ENCOUNTER — OFFICE VISIT (OUTPATIENT)
Dept: NEPHROLOGY | Facility: CLINIC | Age: 40
End: 2025-09-03
Payer: MEDICAID

## 2025-09-03 DIAGNOSIS — I10 PRIMARY HYPERTENSION: ICD-10-CM

## 2025-09-03 DIAGNOSIS — E21.3 HYPERPARATHYROIDISM, UNSPECIFIED: ICD-10-CM

## 2025-09-03 DIAGNOSIS — E83.59 NEPHROCALCINOSIS: Primary | ICD-10-CM

## 2025-09-03 DIAGNOSIS — N29 NEPHROCALCINOSIS: Primary | ICD-10-CM

## 2025-09-03 RX ORDER — POTASSIUM CITRATE 540 MG/1
10 TABLET, EXTENDED RELEASE ORAL
Qty: 540 TABLET | Refills: 3 | Status: SHIPPED | OUTPATIENT
Start: 2025-09-03 | End: 2026-09-03

## 2025-09-03 RX ORDER — CHLORTHALIDONE 25 MG/1
25 TABLET ORAL DAILY
Qty: 90 TABLET | Refills: 3 | Status: SHIPPED | OUTPATIENT
Start: 2025-09-03 | End: 2026-09-03